# Patient Record
Sex: FEMALE | Race: BLACK OR AFRICAN AMERICAN | NOT HISPANIC OR LATINO | URBAN - METROPOLITAN AREA
[De-identification: names, ages, dates, MRNs, and addresses within clinical notes are randomized per-mention and may not be internally consistent; named-entity substitution may affect disease eponyms.]

---

## 2017-03-24 ENCOUNTER — EMERGENCY (EMERGENCY)
Facility: HOSPITAL | Age: 42
LOS: 0 days | Discharge: HOME | End: 2017-03-24
Attending: EMERGENCY MEDICINE
Payer: COMMERCIAL

## 2017-03-24 VITALS
OXYGEN SATURATION: 100 % | DIASTOLIC BLOOD PRESSURE: 93 MMHG | TEMPERATURE: 99 F | RESPIRATION RATE: 20 BRPM | HEART RATE: 112 BPM | HEIGHT: 62 IN | WEIGHT: 184.97 LBS | SYSTOLIC BLOOD PRESSURE: 159 MMHG

## 2017-03-24 VITALS
TEMPERATURE: 99 F | HEART RATE: 108 BPM | DIASTOLIC BLOOD PRESSURE: 67 MMHG | SYSTOLIC BLOOD PRESSURE: 126 MMHG | OXYGEN SATURATION: 96 % | RESPIRATION RATE: 20 BRPM

## 2017-03-24 DIAGNOSIS — R06.02 SHORTNESS OF BREATH: ICD-10-CM

## 2017-03-24 DIAGNOSIS — J44.1 CHRONIC OBSTRUCTIVE PULMONARY DISEASE WITH (ACUTE) EXACERBATION: ICD-10-CM

## 2017-03-24 DIAGNOSIS — F17.210 NICOTINE DEPENDENCE, CIGARETTES, UNCOMPLICATED: ICD-10-CM
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
## 2017-03-24 DIAGNOSIS — Z88.8 ALLERGY STATUS TO OTHER DRUGS, MEDICAMENTS AND BIOLOGICAL SUBSTANCES STATUS: ICD-10-CM
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
## 2017-03-24 DIAGNOSIS — T78.40XA ALLERGY, UNSPECIFIED, INITIAL ENCOUNTER: ICD-10-CM

## 2017-03-24 DIAGNOSIS — J40 BRONCHITIS, NOT SPECIFIED AS ACUTE OR CHRONIC: ICD-10-CM

## 2017-03-24 LAB
ALBUMIN SERPL ELPH-MCNC: 3.7 G/DL — SIGNIFICANT CHANGE UP (ref 3.3–5)
ALP SERPL-CCNC: 86 U/L — SIGNIFICANT CHANGE UP (ref 40–120)
ALT FLD-CCNC: 29 U/L — SIGNIFICANT CHANGE UP (ref 12–78)
ANION GAP SERPL CALC-SCNC: 9 MMOL/L — SIGNIFICANT CHANGE UP (ref 5–17)
APTT BLD: 28.1 SEC — SIGNIFICANT CHANGE UP (ref 27.5–37.4)
AST SERPL-CCNC: 31 U/L — SIGNIFICANT CHANGE UP (ref 15–37)
BASE EXCESS BLDA CALC-SCNC: -0.8 MMOL/L — SIGNIFICANT CHANGE UP (ref -2–2)
BASOPHILS # BLD AUTO: 0 K/UL — SIGNIFICANT CHANGE UP (ref 0–0.2)
BASOPHILS NFR BLD AUTO: 0.5 % — SIGNIFICANT CHANGE UP (ref 0–2)
BILIRUB SERPL-MCNC: 0.8 MG/DL — SIGNIFICANT CHANGE UP (ref 0.2–1.2)
BLOOD GAS COMMENTS: SIGNIFICANT CHANGE UP
BLOOD GAS COMMENTS: SIGNIFICANT CHANGE UP
BLOOD GAS SOURCE: SIGNIFICANT CHANGE UP
BUN SERPL-MCNC: 6 MG/DL — LOW (ref 7–23)
CALCIUM SERPL-MCNC: 8.4 MG/DL — LOW (ref 8.5–10.1)
CHLORIDE SERPL-SCNC: 104 MMOL/L — SIGNIFICANT CHANGE UP (ref 96–108)
CK MB BLD-MCNC: 0.7 % — SIGNIFICANT CHANGE UP (ref 0–3.5)
CK MB CFR SERPL CALC: 1.8 NG/ML — SIGNIFICANT CHANGE UP (ref 0.5–3.6)
CK SERPL-CCNC: 252 U/L — HIGH (ref 26–192)
CO2 SERPL-SCNC: 26 MMOL/L — SIGNIFICANT CHANGE UP (ref 22–31)
CREAT SERPL-MCNC: 0.87 MG/DL — SIGNIFICANT CHANGE UP (ref 0.5–1.3)
D DIMER BLD IA.RAPID-MCNC: <150 NG/ML DDU — SIGNIFICANT CHANGE UP
EOSINOPHIL # BLD AUTO: 0.1 K/UL — SIGNIFICANT CHANGE UP (ref 0–0.5)
EOSINOPHIL NFR BLD AUTO: 2.2 % — SIGNIFICANT CHANGE UP (ref 0–6)
GLUCOSE SERPL-MCNC: 128 MG/DL — HIGH (ref 70–99)
HCG SERPL-ACNC: <1 MIU/ML — SIGNIFICANT CHANGE UP
HCO3 BLDA-SCNC: 23 MMOL/L — SIGNIFICANT CHANGE UP (ref 21–29)
HCT VFR BLD CALC: 41.8 % — SIGNIFICANT CHANGE UP (ref 34.5–45)
HGB BLD-MCNC: 14.1 G/DL — SIGNIFICANT CHANGE UP (ref 11.5–15.5)
HOROWITZ INDEX BLDA+IHG-RTO: 28 — SIGNIFICANT CHANGE UP
INR BLD: 1.04 RATIO — SIGNIFICANT CHANGE UP (ref 0.88–1.16)
LYMPHOCYTES # BLD AUTO: 1.4 K/UL — SIGNIFICANT CHANGE UP (ref 1–3.3)
LYMPHOCYTES # BLD AUTO: 24 % — SIGNIFICANT CHANGE UP (ref 13–44)
MCHC RBC-ENTMCNC: 33.8 GM/DL — SIGNIFICANT CHANGE UP (ref 32–36)
MCHC RBC-ENTMCNC: 34 PG — SIGNIFICANT CHANGE UP (ref 27–34)
MCV RBC AUTO: 100.6 FL — HIGH (ref 80–100)
MONOCYTES # BLD AUTO: 0.3 K/UL — SIGNIFICANT CHANGE UP (ref 0–0.9)
MONOCYTES NFR BLD AUTO: 5.6 % — SIGNIFICANT CHANGE UP (ref 2–14)
NEUTROPHILS # BLD AUTO: 3.9 K/UL — SIGNIFICANT CHANGE UP (ref 1.8–7.4)
NEUTROPHILS NFR BLD AUTO: 67.7 % — SIGNIFICANT CHANGE UP (ref 43–77)
NT-PROBNP SERPL-SCNC: 69 PG/ML — SIGNIFICANT CHANGE UP (ref 0–125)
PCO2 BLDA: 36 MMHG — SIGNIFICANT CHANGE UP (ref 32–46)
PH BLD: 7.42 — SIGNIFICANT CHANGE UP (ref 7.35–7.45)
PLATELET # BLD AUTO: 185 K/UL — SIGNIFICANT CHANGE UP (ref 150–400)
PO2 BLDA: 81 MMHG — SIGNIFICANT CHANGE UP (ref 74–108)
POTASSIUM SERPL-MCNC: 3.5 MMOL/L — SIGNIFICANT CHANGE UP (ref 3.5–5.3)
POTASSIUM SERPL-SCNC: 3.5 MMOL/L — SIGNIFICANT CHANGE UP (ref 3.5–5.3)
PROT SERPL-MCNC: 7.7 GM/DL — SIGNIFICANT CHANGE UP (ref 6–8.3)
PROTHROM AB SERPL-ACNC: 11.4 SEC — SIGNIFICANT CHANGE UP (ref 9.8–12.7)
RBC # BLD: 4.15 M/UL — SIGNIFICANT CHANGE UP (ref 3.8–5.2)
RBC # FLD: 13.8 % — SIGNIFICANT CHANGE UP (ref 11–15)
SAO2 % BLDA: 96 % — SIGNIFICANT CHANGE UP (ref 92–96)
SODIUM SERPL-SCNC: 139 MMOL/L — SIGNIFICANT CHANGE UP (ref 135–145)
TROPONIN I SERPL-MCNC: <.015 NG/ML — SIGNIFICANT CHANGE UP (ref 0.01–0.04)
WBC # BLD: 5.7 K/UL — SIGNIFICANT CHANGE UP (ref 3.8–10.5)
WBC # FLD AUTO: 5.7 K/UL — SIGNIFICANT CHANGE UP (ref 3.8–10.5)

## 2017-03-24 PROCEDURE — 71010: CPT | Mod: 26

## 2017-03-24 PROCEDURE — 99285 EMERGENCY DEPT VISIT HI MDM: CPT

## 2017-03-24 RX ORDER — PANTOPRAZOLE SODIUM 20 MG/1
40 TABLET, DELAYED RELEASE ORAL ONCE
Qty: 0 | Refills: 0 | Status: COMPLETED | OUTPATIENT
Start: 2017-03-24 | End: 2017-03-24

## 2017-03-24 RX ORDER — DIPHENHYDRAMINE HCL 50 MG
50 CAPSULE ORAL ONCE
Qty: 0 | Refills: 0 | Status: COMPLETED | OUTPATIENT
Start: 2017-03-24 | End: 2017-03-24

## 2017-03-24 RX ORDER — DIPHENHYDRAMINE HCL 50 MG
2 CAPSULE ORAL
Qty: 18 | Refills: 0 | OUTPATIENT
Start: 2017-03-24 | End: 2017-03-27

## 2017-03-24 RX ORDER — ALBUTEROL 90 UG/1
2 AEROSOL, METERED ORAL
Qty: 1 | Refills: 0 | OUTPATIENT
Start: 2017-03-24 | End: 2017-04-23

## 2017-03-24 RX ORDER — IPRATROPIUM/ALBUTEROL SULFATE 18-103MCG
3 AEROSOL WITH ADAPTER (GRAM) INHALATION ONCE
Qty: 0 | Refills: 0 | Status: COMPLETED | OUTPATIENT
Start: 2017-03-24 | End: 2017-03-24

## 2017-03-24 RX ORDER — MAGNESIUM SULFATE 500 MG/ML
2 VIAL (ML) INJECTION ONCE
Qty: 0 | Refills: 0 | Status: COMPLETED | OUTPATIENT
Start: 2017-03-24 | End: 2017-03-24

## 2017-03-24 RX ORDER — FAMOTIDINE 10 MG/ML
20 INJECTION INTRAVENOUS ONCE
Qty: 0 | Refills: 0 | Status: COMPLETED | OUTPATIENT
Start: 2017-03-24 | End: 2017-03-24

## 2017-03-24 RX ORDER — FAMOTIDINE 10 MG/ML
1 INJECTION INTRAVENOUS
Qty: 7 | Refills: 0 | OUTPATIENT
Start: 2017-03-24 | End: 2017-03-31

## 2017-03-24 RX ORDER — CIPROFLOXACIN LACTATE 400MG/40ML
1 VIAL (ML) INTRAVENOUS
Qty: 10 | Refills: 0 | OUTPATIENT
Start: 2017-03-24 | End: 2017-04-03

## 2017-03-24 RX ADMIN — Medication 50 MILLIGRAM(S): at 14:45

## 2017-03-24 RX ADMIN — Medication 50 GRAM(S): at 14:45

## 2017-03-24 RX ADMIN — PANTOPRAZOLE SODIUM 40 MILLIGRAM(S): 20 TABLET, DELAYED RELEASE ORAL at 13:31

## 2017-03-24 RX ADMIN — FAMOTIDINE 20 MILLIGRAM(S): 10 INJECTION INTRAVENOUS at 14:45

## 2017-03-24 RX ADMIN — Medication 3 MILLILITER(S): at 13:10

## 2017-03-24 RX ADMIN — Medication 125 MILLIGRAM(S): at 13:31

## 2017-03-24 RX ADMIN — Medication 3 MILLILITER(S): at 13:31

## 2017-03-24 RX ADMIN — Medication 3 MILLILITER(S): at 13:51

## 2017-03-24 NOTE — ED ADULT NURSE REASSESSMENT NOTE - NS ED NURSE REASSESS COMMENT FT1
patient in no acute distress sleeping at this time easy to arouse denied rash or difficulty swallowing or breathing at this time

## 2017-03-24 NOTE — ED PROVIDER NOTE - PROGRESS NOTE DETAILS
Pt is alert and oriented x 3 c/o itching rash to bilateral fore arms blenching rash denies difficulty of swallowing no sob. Pt has normal PO2 on abg, d-dimer normal, sts she is breathing better after the duoneb and pt developed itching rash all over her body, PE is unlikely. Pt has normal PO2 on abg, d-dimer normal, sts she is breathing better after the duoneb and pt developed itching rash all over her body, PE is unlikely. CT angio chest with iv contrast will increase risk of allergic reaction worsening. ct chest is cancelled. Pt is alert and oriented x 3 smiling sts she is breathing much better now Pt's breath sounds are clear equal bilaterally pt is able to walk to and from the bathroom without sob. Pt denies headache, dizziness, difficulty of swallowing or breathing, cough, chest pain, nausea, vomiting, fever, chills, abd pain. Pt is advised to stop smoking and follow up wit pmd as soon as possible copd and for allergic reaction.

## 2017-03-24 NOTE — ED ADULT NURSE REASSESSMENT NOTE - NS ED NURSE REASSESS COMMENT FT1
patient A&Ox3 steady gait , patient discharge as orders , patient aware she can not drive , confirm understanding , Dr Reyes talk with patient explain she can not drive , she confirm understanding , as per Dr Reyes patient OK to be discharge home at this time discharge as orders heplock remove left ER self ambulated

## 2017-03-24 NOTE — ED ADULT NURSE REASSESSMENT NOTE - NS ED NURSE REASSESS COMMENT FT1
patient c/o of rash bilateral arms , Md aware , denied difficulty swallowing mild difficulty breathing Md aware

## 2017-03-24 NOTE — ED PROVIDER NOTE - CONSTITUTIONAL, MLM
normal... Well appearing, well nourished, awake, alert, oriented to person, place, time/situation and in no apparent distress. Speaking in clear full sentences no nasal flaring no shoulders retractions no diaphoresis

## 2017-03-24 NOTE — ED PROVIDER NOTE - CARE PLAN
Principal Discharge DX:	COPD (chronic obstructive pulmonary disease)  Secondary Diagnosis:	Bronchitis  Secondary Diagnosis:	Allergic reaction

## 2017-03-24 NOTE — ED PROVIDER NOTE - OBJECTIVE STATEMENT
41 years old female where c/o productive coughs with green sputum for 3 days and sob /chest congestion since this morning. Pt admits to smoke a pack of cigarettes per day for years. Pt denies headache, dizziness, nausea, vomiting, fever, chills, chest pain, abd pain, vaginal spotting or discharge, dysuria or irregular bowel movements.

## 2018-03-27 ENCOUNTER — APPOINTMENT (OUTPATIENT)
Dept: DERMATOLOGY | Facility: CLINIC | Age: 43
End: 2018-03-27
Payer: COMMERCIAL

## 2018-03-27 VITALS
SYSTOLIC BLOOD PRESSURE: 122 MMHG | DIASTOLIC BLOOD PRESSURE: 78 MMHG | WEIGHT: 180 LBS | HEIGHT: 62 IN | BODY MASS INDEX: 33.13 KG/M2

## 2018-03-27 DIAGNOSIS — Z91.89 OTHER SPECIFIED PERSONAL RISK FACTORS, NOT ELSEWHERE CLASSIFIED: ICD-10-CM

## 2018-03-27 DIAGNOSIS — B36.0 PITYRIASIS VERSICOLOR: ICD-10-CM

## 2018-03-27 DIAGNOSIS — L24.9 IRRITANT CONTACT DERMATITIS, UNSPECIFIED CAUSE: ICD-10-CM

## 2018-03-27 DIAGNOSIS — Z78.9 OTHER SPECIFIED HEALTH STATUS: ICD-10-CM

## 2018-03-27 DIAGNOSIS — Z84.0 FAMILY HISTORY OF DISEASES OF THE SKIN AND SUBCUTANEOUS TISSUE: ICD-10-CM

## 2018-03-27 PROCEDURE — 99203 OFFICE O/P NEW LOW 30 MIN: CPT

## 2018-10-27 ENCOUNTER — EMERGENCY (EMERGENCY)
Age: 43
LOS: 1 days | Discharge: HOME | End: 2018-10-27
Attending: EMERGENCY MEDICINE | Admitting: EMERGENCY MEDICINE
Payer: COMMERCIAL

## 2018-10-27 VITALS
OXYGEN SATURATION: 100 % | SYSTOLIC BLOOD PRESSURE: 163 MMHG | DIASTOLIC BLOOD PRESSURE: 95 MMHG | RESPIRATION RATE: 20 BRPM | TEMPERATURE: 99 F | HEART RATE: 102 BPM

## 2018-10-27 VITALS
DIASTOLIC BLOOD PRESSURE: 88 MMHG | SYSTOLIC BLOOD PRESSURE: 160 MMHG | TEMPERATURE: 98 F | OXYGEN SATURATION: 98 % | HEART RATE: 125 BPM | RESPIRATION RATE: 26 BRPM

## 2018-10-27 PROCEDURE — 99284 EMERGENCY DEPT VISIT MOD MDM: CPT | Mod: 25

## 2018-10-27 PROCEDURE — 71046 X-RAY EXAM CHEST 2 VIEWS: CPT | Mod: 26

## 2018-10-27 RX ORDER — IPRATROPIUM/ALBUTEROL SULFATE 18-103MCG
3 AEROSOL WITH ADAPTER (GRAM) INHALATION ONCE
Qty: 0 | Refills: 0 | Status: COMPLETED | OUTPATIENT
Start: 2018-10-27 | End: 2018-10-27

## 2018-10-27 RX ORDER — DIPHENHYDRAMINE HCL 50 MG
25 CAPSULE ORAL ONCE
Qty: 0 | Refills: 0 | Status: COMPLETED | OUTPATIENT
Start: 2018-10-27 | End: 2018-10-27

## 2018-10-27 RX ORDER — ALBUTEROL 90 UG/1
2 AEROSOL, METERED ORAL
Qty: 1 | Refills: 0 | OUTPATIENT
Start: 2018-10-27 | End: 2018-11-25

## 2018-10-27 RX ADMIN — Medication 25 MILLIGRAM(S): at 07:42

## 2018-10-27 RX ADMIN — Medication 3 MILLILITER(S): at 05:00

## 2018-10-27 RX ADMIN — Medication 3 MILLILITER(S): at 06:29

## 2018-10-27 RX ADMIN — Medication 3 MILLILITER(S): at 06:19

## 2018-10-27 RX ADMIN — Medication 60 MILLIGRAM(S): at 06:48

## 2018-10-27 NOTE — ED ADULT TRIAGE NOTE - CHIEF COMPLAINT QUOTE
Pt brought from [facility]'s ED with albuterol tx in progress for SOB, difficulty breathing, wheezing since Wed, pt also c/o left lower back pain when coughing. Reports had first asthma attack on Wed. PMHx bronchitis. EKG in progress. Pt brought from [facility]'s ED with albuterol (per pt.) tx in progress for SOB, difficulty breathing, wheezing since Wed, pt also c/o left lower back pain when coughing. Reports had first asthma attack on Wed. PMHx bronchitis. EKG in progress.

## 2018-10-27 NOTE — ED PROVIDER NOTE - PHYSICAL EXAMINATION
Gen: Well appearing, well nourished, awake, alert, oriented to person, place, time/situation and in no apparent distress.  ENMT: Airway patent. Moist mucous membranes. No stridor.  Cardiac: Tachycardic, regular rhythm.  Heart sounds S1, S2.  Respiratory: Diffuse end-expiratory wheeze. No rales/rhonchi. Pt is tachypneic with slightly increased WOB.  Abdomen: Abdomen soft, non-distended, non-tender, no guarding.  Musculoskeletal: Atraumatic. No vascular compromise. No calf swelling/tenderness. No pedal edema.  Neuro: Alert, follows commands. Speech is clear, fluent, and appropriate. Moving all extremities spontaneously.  Skin: Skin normal color for race, warm, dry and intact. No evidence of rash.

## 2018-10-27 NOTE — ED ADULT NURSE NOTE - NSIMPLEMENTINTERV_GEN_ALL_ED
Implemented All Universal Safety Interventions:  [place] to call system. Call bell, personal items and telephone within reach. Instruct patient to call for assistance. Room bathroom lighting operational. Non-slip footwear when patient is off stretcher. Physically safe environment: no spills, clutter or unnecessary equipment. Stretcher in lowest position, wheels locked, appropriate side rails in place.

## 2018-10-27 NOTE — ED ADULT NURSE NOTE - CHIEF COMPLAINT QUOTE
Pt brought from [facility]'s ED with albuterol (per pt.) tx in progress for SOB, difficulty breathing, wheezing since Wed, pt also c/o left lower back pain when coughing. Reports had first asthma attack on Wed. PMHx bronchitis. EKG in progress.

## 2018-10-27 NOTE — ED PROVIDER NOTE - ATTENDING CONTRIBUTION TO CARE
I agree with the above H&P.  Briefly this is a 43 year old female with wheezing and sob.  patient is a current smoker.  concern for asthma exacerbation. will rule out pna with cxr.  patient symptomatically improved with nebs

## 2018-10-27 NOTE — ED PROVIDER NOTE - MEDICAL DECISION MAKING DETAILS
43F with SOB/wheezing most c/w asthma/COPD exacerbation. Concern for PNA given green sputum. Pt is well-appearing with slightly increased WOB. Plan: duonebs, steroids, CXR, reassess.

## 2018-10-27 NOTE — ED PROVIDER NOTE - OBJECTIVE STATEMENT
43F h/o asthma/COPD, current smoker, p/w SOB today in the setting of sinus congestion x 3 days, a/w cough productive of yellow and green sputum. No fevers, chest pain, N/V, or abd pain. No recent steroids. No ICU admissions or intubations for asthma.

## 2018-10-28 NOTE — ED POST DISCHARGE NOTE - DETAILS
Esmer LEE, PGY-4: called pt at [phone], no answer, unable to leave msg Esmer LEE, PGY-4: called pt at [phone], no answer, unable to leave msg

## 2018-10-28 NOTE — ED POST DISCHARGE NOTE - ADDITIONAL DOCUMENTATION
Esmer LEE, PGY-4: Received email from NASIMA Myers that pt called and asked for abx. Pt's CXR was clear and pt's presentation was c/w asthma exacerbation in the setting of a URI (not a pneumonia), abx were not given when pt was seen in the ED. I attempted to call pt at listed number to check on how she was doing but was unable to reach her. Esmer LEE, PGY-4: Received email from NASIMA Myers that pt called and asked for abx. Pt's CXR was clear and pt's presentation was c/w asthma exacerbation in the setting of a URI (not a pneumonia), so abx were not prescribed. I attempted to call pt at listed number to check on how she was doing but was unable to reach her.

## 2018-10-28 NOTE — ED POST DISCHARGE NOTE - REASON FOR FOLLOW-UP
Other Patient called asking about Abx that was supposed to be ERX to her pharmacy. No mention in ED provider note of Abx. Emailed Dr Moy and Dr Lyman asking if Abx required. Patient received Ventolin and prednisone.

## 2018-12-17 ENCOUNTER — INPATIENT (INPATIENT)
Facility: HOSPITAL | Age: 43
LOS: 1 days | Discharge: HOME | End: 2018-12-19
Attending: HOSPITALIST | Admitting: HOSPITALIST
Payer: COMMERCIAL

## 2018-12-17 VITALS
RESPIRATION RATE: 18 BRPM | OXYGEN SATURATION: 96 % | TEMPERATURE: 99 F | WEIGHT: 175.05 LBS | DIASTOLIC BLOOD PRESSURE: 92 MMHG | HEART RATE: 132 BPM | HEIGHT: 63 IN | SYSTOLIC BLOOD PRESSURE: 136 MMHG

## 2018-12-17 LAB
ALBUMIN SERPL ELPH-MCNC: 4.1 G/DL — SIGNIFICANT CHANGE UP (ref 3.3–5)
ALP SERPL-CCNC: 80 U/L — SIGNIFICANT CHANGE UP (ref 40–120)
ALT FLD-CCNC: 65 U/L — SIGNIFICANT CHANGE UP (ref 12–78)
AMYLASE P1 CFR SERPL: 39 U/L — SIGNIFICANT CHANGE UP (ref 25–115)
ANION GAP SERPL CALC-SCNC: 14 MMOL/L — SIGNIFICANT CHANGE UP (ref 5–17)
APTT BLD: 24.8 SEC — LOW (ref 27.5–36.3)
AST SERPL-CCNC: 127 U/L — HIGH (ref 15–37)
BASOPHILS # BLD AUTO: 0.03 K/UL — SIGNIFICANT CHANGE UP (ref 0–0.2)
BASOPHILS NFR BLD AUTO: 0.6 % — SIGNIFICANT CHANGE UP (ref 0–2)
BILIRUB SERPL-MCNC: 1.1 MG/DL — SIGNIFICANT CHANGE UP (ref 0.2–1.2)
BLD GP AB SCN SERPL QL: SIGNIFICANT CHANGE UP
BUN SERPL-MCNC: 6 MG/DL — LOW (ref 7–23)
CALCIUM SERPL-MCNC: 8.4 MG/DL — LOW (ref 8.5–10.1)
CHLORIDE SERPL-SCNC: 102 MMOL/L — SIGNIFICANT CHANGE UP (ref 96–108)
CO2 SERPL-SCNC: 22 MMOL/L — SIGNIFICANT CHANGE UP (ref 22–31)
CREAT SERPL-MCNC: 0.73 MG/DL — SIGNIFICANT CHANGE UP (ref 0.5–1.3)
EOSINOPHIL # BLD AUTO: 0.08 K/UL — SIGNIFICANT CHANGE UP (ref 0–0.5)
EOSINOPHIL NFR BLD AUTO: 1.5 % — SIGNIFICANT CHANGE UP (ref 0–6)
ETHANOL SERPL-MCNC: <10 MG/DL — SIGNIFICANT CHANGE UP (ref 0–10)
GLUCOSE SERPL-MCNC: 130 MG/DL — HIGH (ref 70–99)
HCG SERPL-ACNC: <1 MIU/ML — SIGNIFICANT CHANGE UP
HCT VFR BLD CALC: 37.5 % — SIGNIFICANT CHANGE UP (ref 34.5–45)
HGB BLD-MCNC: 12.9 G/DL — SIGNIFICANT CHANGE UP (ref 11.5–15.5)
IMM GRANULOCYTES NFR BLD AUTO: 0.2 % — SIGNIFICANT CHANGE UP (ref 0–1.5)
INR BLD: 1.12 RATIO — SIGNIFICANT CHANGE UP (ref 0.88–1.16)
LIDOCAIN IGE QN: 126 U/L — SIGNIFICANT CHANGE UP (ref 73–393)
LYMPHOCYTES # BLD AUTO: 0.97 K/UL — LOW (ref 1–3.3)
LYMPHOCYTES # BLD AUTO: 18.1 % — SIGNIFICANT CHANGE UP (ref 13–44)
MCHC RBC-ENTMCNC: 34 PG — SIGNIFICANT CHANGE UP (ref 27–34)
MCHC RBC-ENTMCNC: 34.4 GM/DL — SIGNIFICANT CHANGE UP (ref 32–36)
MCV RBC AUTO: 98.9 FL — SIGNIFICANT CHANGE UP (ref 80–100)
MONOCYTES # BLD AUTO: 0.2 K/UL — SIGNIFICANT CHANGE UP (ref 0–0.9)
MONOCYTES NFR BLD AUTO: 3.7 % — SIGNIFICANT CHANGE UP (ref 2–14)
NEUTROPHILS # BLD AUTO: 4.06 K/UL — SIGNIFICANT CHANGE UP (ref 1.8–7.4)
NEUTROPHILS NFR BLD AUTO: 75.9 % — SIGNIFICANT CHANGE UP (ref 43–77)
NRBC # BLD: 0 /100 WBCS — SIGNIFICANT CHANGE UP (ref 0–0)
PLATELET # BLD AUTO: 256 K/UL — SIGNIFICANT CHANGE UP (ref 150–400)
POTASSIUM SERPL-MCNC: 3.6 MMOL/L — SIGNIFICANT CHANGE UP (ref 3.5–5.3)
POTASSIUM SERPL-SCNC: 3.6 MMOL/L — SIGNIFICANT CHANGE UP (ref 3.5–5.3)
PROT SERPL-MCNC: 7.7 GM/DL — SIGNIFICANT CHANGE UP (ref 6–8.3)
PROTHROM AB SERPL-ACNC: 12.6 SEC — SIGNIFICANT CHANGE UP (ref 10–12.9)
RBC # BLD: 3.79 M/UL — LOW (ref 3.8–5.2)
RBC # FLD: 14.4 % — SIGNIFICANT CHANGE UP (ref 10.3–14.5)
SODIUM SERPL-SCNC: 138 MMOL/L — SIGNIFICANT CHANGE UP (ref 135–145)
WBC # BLD: 5.35 K/UL — SIGNIFICANT CHANGE UP (ref 3.8–10.5)
WBC # FLD AUTO: 5.35 K/UL — SIGNIFICANT CHANGE UP (ref 3.8–10.5)

## 2018-12-17 PROCEDURE — 99285 EMERGENCY DEPT VISIT HI MDM: CPT

## 2018-12-17 RX ORDER — PANTOPRAZOLE SODIUM 20 MG/1
40 TABLET, DELAYED RELEASE ORAL ONCE
Qty: 0 | Refills: 0 | Status: COMPLETED | OUTPATIENT
Start: 2018-12-17 | End: 2018-12-17

## 2018-12-17 RX ORDER — ONDANSETRON 8 MG/1
4 TABLET, FILM COATED ORAL ONCE
Qty: 0 | Refills: 0 | Status: COMPLETED | OUTPATIENT
Start: 2018-12-17 | End: 2018-12-17

## 2018-12-17 RX ORDER — PANTOPRAZOLE SODIUM 20 MG/1
8 TABLET, DELAYED RELEASE ORAL
Qty: 80 | Refills: 0 | Status: DISCONTINUED | OUTPATIENT
Start: 2018-12-17 | End: 2018-12-19

## 2018-12-17 RX ORDER — SODIUM CHLORIDE 9 MG/ML
1000 INJECTION INTRAMUSCULAR; INTRAVENOUS; SUBCUTANEOUS ONCE
Qty: 0 | Refills: 0 | Status: COMPLETED | OUTPATIENT
Start: 2018-12-17 | End: 2018-12-17

## 2018-12-17 RX ADMIN — SODIUM CHLORIDE 1000 MILLILITER(S): 9 INJECTION INTRAMUSCULAR; INTRAVENOUS; SUBCUTANEOUS at 20:58

## 2018-12-17 RX ADMIN — PANTOPRAZOLE SODIUM 10 MG/HR: 20 TABLET, DELAYED RELEASE ORAL at 20:57

## 2018-12-17 RX ADMIN — PANTOPRAZOLE SODIUM 40 MILLIGRAM(S): 20 TABLET, DELAYED RELEASE ORAL at 21:00

## 2018-12-17 RX ADMIN — SODIUM CHLORIDE 1000 MILLILITER(S): 9 INJECTION INTRAMUSCULAR; INTRAVENOUS; SUBCUTANEOUS at 21:55

## 2018-12-17 RX ADMIN — ONDANSETRON 4 MILLIGRAM(S): 8 TABLET, FILM COATED ORAL at 20:57

## 2018-12-17 NOTE — ED PROVIDER NOTE - PHYSICAL EXAMINATION
Gen: Alert, NAD  Head: NC, AT, normal lids/conjunctiva  ENT: normal hearing, patent oropharynx without erythema/exudate, uvula midline  Neck: +supple, no tenderness/meningismus/JVD, +Trachea midline  Pulm: Bilateral BS, normal resp effort, no wheeze/stridor/retractions  CV: tachycardia, no M/R/G, +dist pulses  Abd: soft, +epigastric and lower abdominal tenderness to palpation, ND, +BS, no palpable masses  Mskel: no edema/erythema/cyanosis  Skin: no rash, warm/dry  Neuro: AAOx3, no apparent sensory/motor deficits, coordination intact

## 2018-12-17 NOTE — ED ADULT NURSE NOTE - OBJECTIVE STATEMENT
Pt is A&o X3, presents with complaints of hematemesis, bright red in color, associated w/ mid abdominal pain associated onset tofday. also reports that she had 3 episodes of diarrhea yesterday. No fevers or chills

## 2018-12-17 NOTE — ED PROVIDER NOTE - OBJECTIVE STATEMENT
Pertinent PMH/PSH/FHx/SHx and Review of Systems contained within:  Patient presents to the ED for vomiting blood.  Patient started vomiting today, states that vomit started clear, gradually became pink tinged and eventually progressed to large bright red blood.  Denies chest pain or shortness of breath, has episgastric pain and soreness from her liposuction surgery 1 month ago.  Denies any melena or diarrhea.  Does not take blood thinners but is a daily drinker consuming 4-5 shots of hard liquor every night.  Denies prior history of vomiting blood.     Relevant PMHx/SHx/SOCHx/FAMH:  asthma, HTN, liposuction & gluteal lift 1 month ago  +Smoker, denies use of other illict drugs    ROS: No fever/chills, No headache/photophobia/eye pain/changes in vision, No ear pain/sore throat/dysphagia, No chest pain/palpitations, no SOB/cough/wheeze/stridor, No D/melena, no dysuria/frequency/discharge, No neck/back pain, no rash, no changes in neurological status/function.

## 2018-12-17 NOTE — ED PROVIDER NOTE - MEDICAL DECISION MAKING DETAILS
Patient with hematemsis in setting of regular drinking.  VSS.  No episodes of hematemsis in ER.  Lab values reviewed, there are no values which require acute intervention.  CT imaging without acute pathology.  On protonix drip.  GI to be consulted in the morning.  Patient is to be admitted to the hospital and the case was discussed with the admitting physician.  Any changes in plan, additional imaging/labs, and further work up will be at the discretion of the admitting physician.

## 2018-12-17 NOTE — ED ADULT NURSE NOTE - NSIMPLEMENTINTERV_GEN_ALL_ED
Implemented All Universal Safety Interventions:  [place] to call system. Call bell, personal items and telephone within reach. Instruct patient to call for assistance. Room bathroom lighting operational. Non-slip footwear when patient is off stretcher. Physically safe environment: no spills, clutter or unnecessary equipment. Stretcher in lowest position, wheels locked, appropriate side rails in place.

## 2018-12-18 DIAGNOSIS — Z98.890 OTHER SPECIFIED POSTPROCEDURAL STATES: Chronic | ICD-10-CM

## 2018-12-18 DIAGNOSIS — K92.2 GASTROINTESTINAL HEMORRHAGE, UNSPECIFIED: ICD-10-CM

## 2018-12-18 DIAGNOSIS — J45.20 MILD INTERMITTENT ASTHMA, UNCOMPLICATED: ICD-10-CM

## 2018-12-18 DIAGNOSIS — E51.9 THIAMINE DEFICIENCY, UNSPECIFIED: ICD-10-CM

## 2018-12-18 DIAGNOSIS — F10.10 ALCOHOL ABUSE, UNCOMPLICATED: ICD-10-CM

## 2018-12-18 PROBLEM — J44.9 CHRONIC OBSTRUCTIVE PULMONARY DISEASE, UNSPECIFIED: Chronic | Status: INACTIVE | Noted: 2018-10-27 | Resolved: 2018-12-18

## 2018-12-18 LAB
ALBUMIN SERPL ELPH-MCNC: 3.6 G/DL — SIGNIFICANT CHANGE UP (ref 3.3–5)
ALP SERPL-CCNC: 80 U/L — SIGNIFICANT CHANGE UP (ref 40–120)
ALT FLD-CCNC: 59 U/L — SIGNIFICANT CHANGE UP (ref 12–78)
ANION GAP SERPL CALC-SCNC: 10 MMOL/L — SIGNIFICANT CHANGE UP (ref 5–17)
AST SERPL-CCNC: 112 U/L — HIGH (ref 15–37)
BILIRUB DIRECT SERPL-MCNC: 0.38 MG/DL — HIGH (ref 0.05–0.2)
BILIRUB INDIRECT FLD-MCNC: 0.9 MG/DL — SIGNIFICANT CHANGE UP (ref 0.2–1)
BILIRUB SERPL-MCNC: 1.3 MG/DL — HIGH (ref 0.2–1.2)
BUN SERPL-MCNC: 4 MG/DL — LOW (ref 7–23)
CALCIUM SERPL-MCNC: 8.2 MG/DL — LOW (ref 8.5–10.1)
CHLORIDE SERPL-SCNC: 107 MMOL/L — SIGNIFICANT CHANGE UP (ref 96–108)
CO2 SERPL-SCNC: 24 MMOL/L — SIGNIFICANT CHANGE UP (ref 22–31)
CREAT SERPL-MCNC: 0.7 MG/DL — SIGNIFICANT CHANGE UP (ref 0.5–1.3)
GLUCOSE SERPL-MCNC: 119 MG/DL — HIGH (ref 70–99)
HCT VFR BLD CALC: 36.3 % — SIGNIFICANT CHANGE UP (ref 34.5–45)
HGB BLD-MCNC: 12 G/DL — SIGNIFICANT CHANGE UP (ref 11.5–15.5)
MAGNESIUM SERPL-MCNC: 2.1 MG/DL — SIGNIFICANT CHANGE UP (ref 1.6–2.6)
MCHC RBC-ENTMCNC: 33.1 GM/DL — SIGNIFICANT CHANGE UP (ref 32–36)
MCHC RBC-ENTMCNC: 33.1 PG — SIGNIFICANT CHANGE UP (ref 27–34)
MCV RBC AUTO: 100 FL — SIGNIFICANT CHANGE UP (ref 80–100)
NRBC # BLD: 0 /100 WBCS — SIGNIFICANT CHANGE UP (ref 0–0)
PHOSPHATE SERPL-MCNC: 3 MG/DL — SIGNIFICANT CHANGE UP (ref 2.5–4.5)
PLATELET # BLD AUTO: 192 K/UL — SIGNIFICANT CHANGE UP (ref 150–400)
POTASSIUM SERPL-MCNC: 3.3 MMOL/L — LOW (ref 3.5–5.3)
POTASSIUM SERPL-SCNC: 3.3 MMOL/L — LOW (ref 3.5–5.3)
PROT SERPL-MCNC: 6.7 GM/DL — SIGNIFICANT CHANGE UP (ref 6–8.3)
RBC # BLD: 3.63 M/UL — LOW (ref 3.8–5.2)
RBC # FLD: 14.2 % — SIGNIFICANT CHANGE UP (ref 10.3–14.5)
SODIUM SERPL-SCNC: 141 MMOL/L — SIGNIFICANT CHANGE UP (ref 135–145)
WBC # BLD: 3.27 K/UL — LOW (ref 3.8–10.5)
WBC # FLD AUTO: 3.27 K/UL — LOW (ref 3.8–10.5)

## 2018-12-18 PROCEDURE — 93010 ELECTROCARDIOGRAM REPORT: CPT

## 2018-12-18 PROCEDURE — 12345: CPT | Mod: NC

## 2018-12-18 PROCEDURE — 71045 X-RAY EXAM CHEST 1 VIEW: CPT | Mod: 26

## 2018-12-18 PROCEDURE — 74177 CT ABD & PELVIS W/CONTRAST: CPT | Mod: 26

## 2018-12-18 PROCEDURE — 99223 1ST HOSP IP/OBS HIGH 75: CPT

## 2018-12-18 RX ORDER — NICOTINE POLACRILEX 2 MG
1 GUM BUCCAL DAILY
Qty: 0 | Refills: 0 | Status: DISCONTINUED | OUTPATIENT
Start: 2018-12-18 | End: 2018-12-19

## 2018-12-18 RX ORDER — FOLIC ACID 0.8 MG
1 TABLET ORAL DAILY
Qty: 0 | Refills: 0 | Status: DISCONTINUED | OUTPATIENT
Start: 2018-12-18 | End: 2018-12-19

## 2018-12-18 RX ORDER — SODIUM CHLORIDE 9 MG/ML
1000 INJECTION, SOLUTION INTRAVENOUS
Qty: 0 | Refills: 0 | Status: COMPLETED | OUTPATIENT
Start: 2018-12-18 | End: 2018-12-18

## 2018-12-18 RX ORDER — POTASSIUM CHLORIDE 20 MEQ
40 PACKET (EA) ORAL ONCE
Qty: 0 | Refills: 0 | Status: COMPLETED | OUTPATIENT
Start: 2018-12-18 | End: 2018-12-18

## 2018-12-18 RX ORDER — THIAMINE MONONITRATE (VIT B1) 100 MG
100 TABLET ORAL DAILY
Qty: 0 | Refills: 0 | Status: DISCONTINUED | OUTPATIENT
Start: 2018-12-18 | End: 2018-12-19

## 2018-12-18 RX ORDER — SODIUM CHLORIDE 9 MG/ML
1000 INJECTION, SOLUTION INTRAVENOUS
Qty: 0 | Refills: 0 | Status: DISCONTINUED | OUTPATIENT
Start: 2018-12-18 | End: 2018-12-18

## 2018-12-18 RX ORDER — AMLODIPINE BESYLATE 2.5 MG/1
5 TABLET ORAL DAILY
Qty: 0 | Refills: 0 | Status: DISCONTINUED | OUTPATIENT
Start: 2018-12-18 | End: 2018-12-19

## 2018-12-18 RX ORDER — ALBUTEROL 90 UG/1
2 AEROSOL, METERED ORAL EVERY 6 HOURS
Qty: 0 | Refills: 0 | Status: DISCONTINUED | OUTPATIENT
Start: 2018-12-18 | End: 2018-12-19

## 2018-12-18 RX ORDER — ONDANSETRON 8 MG/1
4 TABLET, FILM COATED ORAL EVERY 6 HOURS
Qty: 0 | Refills: 0 | Status: DISCONTINUED | OUTPATIENT
Start: 2018-12-18 | End: 2018-12-19

## 2018-12-18 RX ADMIN — Medication 100 MILLIGRAM(S): at 11:31

## 2018-12-18 RX ADMIN — Medication 40 MILLIEQUIVALENT(S): at 11:30

## 2018-12-18 RX ADMIN — Medication 1 PATCH: at 17:09

## 2018-12-18 RX ADMIN — PANTOPRAZOLE SODIUM 10 MG/HR: 20 TABLET, DELAYED RELEASE ORAL at 21:09

## 2018-12-18 RX ADMIN — Medication 1 PATCH: at 05:02

## 2018-12-18 RX ADMIN — Medication 1 TABLET(S): at 11:31

## 2018-12-18 RX ADMIN — PANTOPRAZOLE SODIUM 10 MG/HR: 20 TABLET, DELAYED RELEASE ORAL at 12:24

## 2018-12-18 RX ADMIN — SODIUM CHLORIDE 125 MILLILITER(S): 9 INJECTION, SOLUTION INTRAVENOUS at 09:28

## 2018-12-18 RX ADMIN — Medication 1 MILLIGRAM(S): at 11:31

## 2018-12-18 NOTE — CONSULT NOTE ADULT - ASSESSMENT
HPI:  43y Female PMH asthma, HTN, liposuction & gluteal lift 1 month ago, presents to the ED for vomiting blood.  Patient started vomiting today, states that vomit started clear, gradually became pink tinged and eventually progressed to large bright red blood.  Denies chest pain or shortness of breath, has epigastric pain with soreness from her liposuction surgery 1 month ago.  Denies any melena or diarrhea.  Does not take blood thinners but is a daily drinker consuming 4-5 shots of hard liquor every night.  Denies prior history of vomiting blood, Hx PUD, NSAID consumption, is + smoker. (18 Dec 2018 04:57)  --------------------------------------As Above ----------------------------------------------------------------------------------------  Patient seen earlier  The patient was in her USOH until Sunday night when after drinking a large amount of alcohol she did not feel well. She went to sleep and awakened at ~ 5 PM. She started retching shortly afterwards. First it was bile, then pink and finally red. Duration ~ 1 hour. No vomiting since.   Patient felt somewhat better this morning. Hungry.   Drinks 4 - 5 shots QHS since her liposuction. Denies NSAIDs.  Prior to Sunday, the patient denies melena, hematochezia, hematemesis, nausea, vomiting, abdominal pain, constipation, diarrhea, or change in bowel movements Never had a colonoscopy  See labs / CT scan    -----UGI Bleed - probably MW tear, doubt PUD, varices, etc.  1) clear liquid diet  2) f/u CBC  3) PPI  4) EGD in AM

## 2018-12-18 NOTE — H&P ADULT - ASSESSMENT
43y Female PMH asthma, HTN, liposuction & gluteal lift 1 month ago, presents to the ED for vomiting blood. Pt chronic ETOH abuser, no Hx PUD. Started on PPI drip in ED. Will start CIWA protocol, though pt denies Hx DT's, severe withdrawal.  IMPROVE VTE Individual Risk Assessment          RISK                                                          Points    [  ] Previous VTE                                                3    [  ] Thrombophilia                                             2    [  ] Lower limb paralysis                                    2        (unable to hold up >15 seconds)      [  ] Current Cancer                                             2         (within 6 months)    [  ] Immobilization > 24 hrs                              1    [  ] ICU/CCU stay > 24 hours                            1    [  ] Age > 60                                                    1    IMPROVE VTE Score ___0______

## 2018-12-18 NOTE — H&P ADULT - NSHPREVIEWOFSYSTEMS_GEN_ALL_CORE
ROS: No fever/chills, No headache/photophobia/eye pain/changes in vision, No ear pain/sore throat/dysphagia, No chest pain/palpitations, no SOB/cough/wheeze/stridor, No D/melena, no dysuria/frequency/discharge, No neck/back pain, no rash, no changes in neurological status/function.

## 2018-12-18 NOTE — CHART NOTE - NSCHARTNOTEFT_GEN_A_CORE
Patient seen and examined   Full note to follow   UGI Bleed, last episode ~ 6 PM  For EGD [id number] 7:30

## 2018-12-18 NOTE — H&P ADULT - HISTORY OF PRESENT ILLNESS
43y Female PMH asthma, HTN, liposuction & gluteal lift 1 month ago, presents to the ED for vomiting blood.  Patient started vomiting today, states that vomit started clear, gradually became pink tinged and eventually progressed to large bright red blood.  Denies chest pain or shortness of breath, has epigastric pain with soreness from her liposuction surgery 1 month ago.  Denies any melena or diarrhea.  Does not take blood thinners but is a daily drinker consuming 4-5 shots of hard liquor every night.  Denies prior history of vomiting blood, Hx PUD, NSAID consumption, is + smoker.

## 2018-12-18 NOTE — PROGRESS NOTE ADULT - ASSESSMENT
1.  Hematemesis - no further recurrence.  On Protonix IV.  Hemoglobin stable.  NPO after midnight for EGD.    2.  Alcoholism.  Monitor for withdrawal.  Thiamine 100 mg daily.   on abstinence.    3.  Tobacco use.  Continue nicotine replacement.   and encourage smoking cessation.    4.  HTN.  Resume outpatient amlodipine.

## 2018-12-18 NOTE — CONSULT NOTE ADULT - SUBJECTIVE AND OBJECTIVE BOX
HPI:  43y Female PMH asthma, HTN, liposuction & gluteal lift 1 month ago, presents to the ED for vomiting blood.  Patient started vomiting today, states that vomit started clear, gradually became pink tinged and eventually progressed to large bright red blood.  Denies chest pain or shortness of breath, has epigastric pain with soreness from her liposuction surgery 1 month ago.  Denies any melena or diarrhea.  Does not take blood thinners but is a daily drinker consuming 4-5 shots of hard liquor every night.  Denies prior history of vomiting blood, Hx PUD, NSAID consumption, is + smoker. (18 Dec 2018 04:57)  --------------------------------------As Above ----------------------------------------------------------------------------------------  Patient seen earlier  The patient was in her USOH until Sunday night when after drinking a large amount of alcohol she did not feel well. She went to sleep and awakened at ~ 5 PM. She started retching shortly afterwards. First it was bile, then pink and finally red. Duration ~ 1 hour. No vomiting since.   Patient felt somewhat better this morning. Hungry.   Drinks 4 - 5 shots QHS since her liposuction. Denies NSAIDs.  Prior to Sunday, the patient denies melena, hematochezia, hematemesis, nausea, vomiting, abdominal pain, constipation, diarrhea, or change in bowel movements Never had a colonoscopy  See labs / CT scan      PAST MEDICAL & SURGICAL HISTORY:  Mild intermittent asthma without complication  H/O cosmetic surgery      MEDICATIONS  (STANDING):  folic acid 1 milliGRAM(s) Oral daily  multivitamin 1 Tablet(s) Oral daily  nicotine -   7 mG/24Hr(s) Patch 1 patch Transdermal daily  pantoprazole Infusion 8 mG/Hr (10 mL/Hr) IV Continuous <Continuous>  thiamine 100 milliGRAM(s) Oral daily    MEDICATIONS  (PRN):  ALBUTerol    90 MICROgram(s) HFA Inhaler 2 Puff(s) Inhalation every 6 hours PRN Shortness of Breath and/or Wheezing  ondansetron Injectable 4 milliGRAM(s) IV Push every 6 hours PRN Nausea and/or Vomiting      Allergies    codeine (Hives)  Motrin (Angioedema)    Intolerances        FAMILY HISTORY:  No pertinent family history in first degree relatives      REVIEW OF SYSTEMS:    CONSTITUTIONAL: No fever, weight loss, or fatigue  EYES: No eye pain, visual disturbances, or discharge  ENMT:  No difficulty hearing, tinnitus, vertigo; No sinus or throat pain  NECK: No pain or stiffness  BREASTS: No pain, masses, or nipple discharge  RESPIRATORY: No cough, wheezing, chills or hemoptysis; No shortness of breath  CARDIOVASCULAR: No chest pain, palpitations, dizziness, or leg swelling  GASTROINTESTINAL: See above  GENITOURINARY: No dysuria, frequency, hematuria, or incontinence  NEUROLOGICAL: No headaches, memory loss, loss of strength, numbness, or tremors  SKIN: No itching, burning, rashes, or lesions   LYMPH NODES: No enlarged glands  ENDOCRINE: No heat or cold intolerance; No hair loss  MUSCULOSKELETAL: No joint pain or swelling; No muscle, back, or extremity pain  PSYCHIATRIC: No depression, anxiety, mood swings, or difficulty sleeping  HEME/LYMPH: No easy bruising, or bleeding gums  ALLERGY AND IMMUNOLOGIC: No hives or eczema          SOCIAL HISTORY:    FAMILY HISTORY:  No pertinent family history in first degree relatives      Vital Signs Last 24 Hrs  T(C): 37 (18 Dec 2018 10:26), Max: 37.6 (18 Dec 2018 07:04)  T(F): 98.6 (18 Dec 2018 10:26), Max: 99.6 (18 Dec 2018 07:04)  HR: 88 (18 Dec 2018 10:26) (88 - 132)  BP: 144/76 (18 Dec 2018 10:26) (136/92 - 145/74)  BP(mean): --  RR: 18 (18 Dec 2018 10:26) (16 - 18)  SpO2: 98% (18 Dec 2018 10:26) (95% - 99%)    PHYSICAL EXAM:    GENERAL: NAD, well-groomed, well-developed  HEAD:  Atraumatic, Normocephalic  EYES: EOMI, PERRLA, conjunctiva and sclera clear  ENMT: No tonsillar erythema, exudates, or enlargement; Moist mucous membranes, Good dentition, No lesions  NECK: Supple, No JVD, Normal thyroid  NERVOUS SYSTEM:  Alert & Oriented X3, Good concentration;   CHEST/LUNG: Clear to percussion bilaterally; No rales, rhonchi, wheezing, or rubs  HEART: Regular rate and rhythm; No murmurs, rubs, or gallops  ABDOMEN: Soft, Nontender, Nondistended; Bowel sounds present  EXTREMITIES:  2+ Peripheral Pulses, No clubbing, cyanosis, or edema  LYMPH: No lymphadenopathy noted   RECTAL: Deferred   SKIN: No rashes or lesions    LABS:                        12.0   3.27  )-----------( 192      ( 18 Dec 2018 07:53 )             36.3       CBC:  12-18 @ 07:53  WBC  3.27  HGB 12.0  HCT 36.3 Plate 192  .0  12-17 @ 20:49  WBC  5.35  HGB 12.9  HCT 37.5 Plate 256  MCV 98.9           18 Dec 2018 08:10    141    |  107    |  4      ----------------------------<  119    3.3     |  24     |  0.70   17 Dec 2018 20:49    138    |  102    |  6      ----------------------------<  130    3.6     |  22     |  0.73     Ca    8.2        18 Dec 2018 08:10  Ca    8.4        17 Dec 2018 20:49  Phos  3.0       18 Dec 2018 08:10  Mg     2.1       18 Dec 2018 08:10    TPro  6.7    /  Alb  3.6    /  TBili  1.3    /  DBili  .38    /  AST  112    /  ALT  59     /  AlkPhos  80     18 Dec 2018 08:10  TPro  7.7    /  Alb  4.1    /  TBili  1.1    /  DBili  x      /  AST  127    /  ALT  65     /  AlkPhos  80     17 Dec 2018 20:49    PT/INR - ( 17 Dec 2018 20:49 )   PT: 12.6 sec;   INR: 1.12 ratio         PTT - ( 17 Dec 2018 20:49 )  PTT:24.8 sec        RADIOLOGY & ADDITIONAL STUDIES:

## 2018-12-18 NOTE — H&P ADULT - NSHPPHYSICALEXAM_GEN_ALL_CORE
T(C): 37.5 (18 Dec 2018 00:29), Max: 37.5 (18 Dec 2018 00:29)  T(F): 99.5 (18 Dec 2018 00:29), Max: 99.5 (18 Dec 2018 00:29)  HR: 117 (18 Dec 2018 00:29) (117 - 132)  BP: 140/75 (18 Dec 2018 00:29) (136/92 - 140/75)  BP(mean): --  RR: 18 (18 Dec 2018 00:29) (18 - 18)  SpO2: 99% (18 Dec 2018 00:29) (96% - 99%)    PHYSICAL EXAM:  GENERAL: NAD, well-groomed, well-developed  HEAD:  Atraumatic, Normocephalic  EYES: EOMI, PERRLA, conjunctiva and sclera clear  ENMT: No tonsillar erythema, exudates, or enlargement; Moist mucous membranes, Good dentition, No lesions  NECK: Supple, No JVD, Normal thyroid  NERVOUS SYSTEM:  Alert & Oriented X3, Good concentration; Motor Strength 5/5 B/L upper and lower extremities; DTRs 2+ intact and symmetric  CHEST/LUNG: Clear to percussion bilaterally; No rales, rhonchi, wheezing, or rubs  HEART: Regular rate and rhythm; No murmurs, rubs, or gallops  ABDOMEN: Soft, tender epigastrium, Nondistended; Bowel sounds present  EXTREMITIES:  2+ Peripheral Pulses, No clubbing, cyanosis, or edema  LYMPH: No lymphadenopathy noted  SKIN: No rashes or lesions

## 2018-12-18 NOTE — H&P ADULT - NSHPLABSRESULTS_GEN_ALL_CORE
LABS:                        12.9   5.35  )-----------( 256      ( 17 Dec 2018 20:49 )             37.5     12-17    138  |  102  |  6<L>  ----------------------------<  130<H>  3.6   |  22  |  0.73    Ca    8.4<L>      17 Dec 2018 20:49    TPro  7.7  /  Alb  4.1  /  TBili  1.1  /  DBili  x   /  AST  127<H>  /  ALT  65  /  AlkPhos  80  12-17    PT/INR - ( 17 Dec 2018 20:49 )   PT: 12.6 sec;   INR: 1.12 ratio         PTT - ( 17 Dec 2018 20:49 )  PTT:24.8 sec    CAPILLARY BLOOD GLUCOSE  Alcohol, Blood: <10:   HCG Quantitative, Serum: <1: mIU/mL (12.17.18 @ 20:49)  mg/dL (12.17.18 @ 20:49)          RADIOLOGY & ADDITIONAL TESTS:  < from: CT Abdomen and Pelvis w/ IV Cont (12.18.18 @ 00:16) >    No acute gastrointestinal abnormality.  Fatty liver.  Fibroid uterus. Intrauterine device in place. 4 cm right adnexal cyst for   which nonemergent pelvic ultrasound may be obtained.    < end of copied text >      Imaging Personally Reviewed:  [x ] YES  [ ] NO

## 2018-12-19 ENCOUNTER — RESULT REVIEW (OUTPATIENT)
Age: 43
End: 2018-12-19

## 2018-12-19 ENCOUNTER — TRANSCRIPTION ENCOUNTER (OUTPATIENT)
Age: 43
End: 2018-12-19

## 2018-12-19 VITALS
RESPIRATION RATE: 17 BRPM | DIASTOLIC BLOOD PRESSURE: 69 MMHG | TEMPERATURE: 98 F | HEART RATE: 96 BPM | SYSTOLIC BLOOD PRESSURE: 133 MMHG | OXYGEN SATURATION: 98 %

## 2018-12-19 LAB
ANION GAP SERPL CALC-SCNC: 12 MMOL/L — SIGNIFICANT CHANGE UP (ref 5–17)
BUN SERPL-MCNC: 4 MG/DL — LOW (ref 7–23)
CALCIUM SERPL-MCNC: 8.5 MG/DL — SIGNIFICANT CHANGE UP (ref 8.5–10.1)
CHLORIDE SERPL-SCNC: 104 MMOL/L — SIGNIFICANT CHANGE UP (ref 96–108)
CO2 SERPL-SCNC: 23 MMOL/L — SIGNIFICANT CHANGE UP (ref 22–31)
CREAT SERPL-MCNC: 0.63 MG/DL — SIGNIFICANT CHANGE UP (ref 0.5–1.3)
GLUCOSE SERPL-MCNC: 114 MG/DL — HIGH (ref 70–99)
HCT VFR BLD CALC: 39.1 % — SIGNIFICANT CHANGE UP (ref 34.5–45)
HGB BLD-MCNC: 12.8 G/DL — SIGNIFICANT CHANGE UP (ref 11.5–15.5)
MAGNESIUM SERPL-MCNC: 2.4 MG/DL — SIGNIFICANT CHANGE UP (ref 1.6–2.6)
MCHC RBC-ENTMCNC: 32.7 GM/DL — SIGNIFICANT CHANGE UP (ref 32–36)
MCHC RBC-ENTMCNC: 33.3 PG — SIGNIFICANT CHANGE UP (ref 27–34)
MCV RBC AUTO: 101.8 FL — HIGH (ref 80–100)
NRBC # BLD: 0 /100 WBCS — SIGNIFICANT CHANGE UP (ref 0–0)
PHOSPHATE SERPL-MCNC: 2.5 MG/DL — SIGNIFICANT CHANGE UP (ref 2.5–4.5)
PLATELET # BLD AUTO: 169 K/UL — SIGNIFICANT CHANGE UP (ref 150–400)
POTASSIUM SERPL-MCNC: 3.6 MMOL/L — SIGNIFICANT CHANGE UP (ref 3.5–5.3)
POTASSIUM SERPL-SCNC: 3.6 MMOL/L — SIGNIFICANT CHANGE UP (ref 3.5–5.3)
RBC # BLD: 3.84 M/UL — SIGNIFICANT CHANGE UP (ref 3.8–5.2)
RBC # FLD: 14 % — SIGNIFICANT CHANGE UP (ref 10.3–14.5)
SODIUM SERPL-SCNC: 139 MMOL/L — SIGNIFICANT CHANGE UP (ref 135–145)
WBC # BLD: 3.47 K/UL — LOW (ref 3.8–10.5)
WBC # FLD AUTO: 3.47 K/UL — LOW (ref 3.8–10.5)

## 2018-12-19 PROCEDURE — 88305 TISSUE EXAM BY PATHOLOGIST: CPT | Mod: 26

## 2018-12-19 PROCEDURE — 88312 SPECIAL STAINS GROUP 1: CPT | Mod: 26

## 2018-12-19 PROCEDURE — 99238 HOSP IP/OBS DSCHRG MGMT 30/<: CPT

## 2018-12-19 RX ORDER — PANTOPRAZOLE SODIUM 20 MG/1
1 TABLET, DELAYED RELEASE ORAL
Qty: 30 | Refills: 0 | OUTPATIENT
Start: 2018-12-19 | End: 2019-01-17

## 2018-12-19 RX ORDER — SODIUM CHLORIDE 9 MG/ML
1000 INJECTION, SOLUTION INTRAVENOUS
Qty: 0 | Refills: 0 | Status: DISCONTINUED | OUTPATIENT
Start: 2018-12-19 | End: 2018-12-19

## 2018-12-19 RX ORDER — FOLIC ACID 0.8 MG
1 TABLET ORAL
Qty: 0 | Refills: 0 | DISCHARGE
Start: 2018-12-19

## 2018-12-19 RX ORDER — THIAMINE MONONITRATE (VIT B1) 100 MG
1 TABLET ORAL
Qty: 30 | Refills: 0 | OUTPATIENT
Start: 2018-12-19 | End: 2019-01-17

## 2018-12-19 RX ORDER — AMLODIPINE BESYLATE 2.5 MG/1
1 TABLET ORAL
Qty: 0 | Refills: 0 | DISCHARGE
Start: 2018-12-19

## 2018-12-19 RX ORDER — ALBUTEROL 90 UG/1
2 AEROSOL, METERED ORAL
Qty: 1 | Refills: 0 | OUTPATIENT
Start: 2018-12-19 | End: 2019-01-17

## 2018-12-19 RX ORDER — PANTOPRAZOLE SODIUM 20 MG/1
40 TABLET, DELAYED RELEASE ORAL
Qty: 0 | Refills: 0 | Status: DISCONTINUED | OUTPATIENT
Start: 2018-12-19 | End: 2018-12-19

## 2018-12-19 RX ORDER — NICOTINE POLACRILEX 2 MG
7 GUM BUCCAL
Qty: 0 | Refills: 0 | COMMUNITY
Start: 2018-12-19

## 2018-12-19 RX ADMIN — Medication 100 MILLIGRAM(S): at 11:09

## 2018-12-19 RX ADMIN — PANTOPRAZOLE SODIUM 10 MG/HR: 20 TABLET, DELAYED RELEASE ORAL at 08:45

## 2018-12-19 RX ADMIN — Medication 1 TABLET(S): at 11:09

## 2018-12-19 RX ADMIN — Medication 1 MILLIGRAM(S): at 11:09

## 2018-12-19 RX ADMIN — AMLODIPINE BESYLATE 5 MILLIGRAM(S): 2.5 TABLET ORAL at 05:14

## 2018-12-19 RX ADMIN — SODIUM CHLORIDE 100 MILLILITER(S): 9 INJECTION, SOLUTION INTRAVENOUS at 08:52

## 2018-12-19 RX ADMIN — Medication 1 PATCH: at 11:09

## 2018-12-19 RX ADMIN — PANTOPRAZOLE SODIUM 10 MG/HR: 20 TABLET, DELAYED RELEASE ORAL at 06:10

## 2018-12-19 NOTE — DISCHARGE NOTE ADULT - PATIENT PORTAL LINK FT
You can access the [facility] Patient Portal, offered by [facility], by registering with the following website: [email or web address]

## 2018-12-19 NOTE — DISCHARGE NOTE ADULT - HOSPITAL COURSE
Patient admitted with hematemesis at home.  She was started on IV pantoprazole gtt and seen by GI, Dr. Adams.    Vital Signs Last 24 Hrs  T(C): 36.8 (19 Dec 2018 10:06), Max: 37.1 (19 Dec 2018 09:33)  T(F): 98.2 (19 Dec 2018 10:06), Max: 98.8 (19 Dec 2018 09:33)  HR: 93 (19 Dec 2018 10:06) (78 - 127)  BP: 123/71 (19 Dec 2018 10:06) (123/71 - 143/76)  BP(mean): --  RR: 17 (19 Dec 2018 10:06) (12 - 20)  SpO2: 98% (19 Dec 2018 10:06) (96% - 100%)    GENERAL: NAD.  CHEST/LUNG: Clear to auscultation; No rales, rhonchi, or wheezing.  Respiratory effort does not appear labored.  HEART: Regular rate and rhythm; S1 and S2,  no murmurs, rubs, or gallops.  ABDOMEN: Soft, not tender to palpation.  No masses or HSM appreciated.  No distension.  Bowel sounds present.  EXTREMITIES:  No clubbing, cyanosis, or edema.  Moves all extremities with strength 5/5.  SKIN: No obvious rashes or lesions.  Turgor okay.  NEURO:  Alert and oriented x 3, no focal sensory or  motor deficit, DTR 2+ bilaterally. Patient admitted with hematemesis at home.  She was started on IV pantoprazole gtt and seen by GI, Dr. Adams.  No hematemesis or bloody/dark stool noted in the hospital.  Hemoglobin remained within normal range.  EGD done 12/19 showed gastritis.  Patient tolerated food without N/V or abdominal pain after EGD and was cleared for discharge by Dr. Adams.  Patient to continue with Protonix.  She was advised to stop alcohol use and avoid NSAIDs.    Vital Signs Last 24 Hrs  T(C): 36.8 (19 Dec 2018 10:06), Max: 37.1 (19 Dec 2018 09:33)  T(F): 98.2 (19 Dec 2018 10:06), Max: 98.8 (19 Dec 2018 09:33)  HR: 93 (19 Dec 2018 10:06) (78 - 127)  BP: 123/71 (19 Dec 2018 10:06) (123/71 - 143/76)  BP(mean): --  RR: 17 (19 Dec 2018 10:06) (12 - 20)  SpO2: 98% (19 Dec 2018 10:06) (96% - 100%)    GENERAL: NAD.  CHEST/LUNG: Clear to auscultation; No rales, rhonchi, or wheezing.  Respiratory effort does not appear labored.  HEART: Regular rate and rhythm; S1 and S2,  no murmurs, rubs, or gallops.  ABDOMEN: Soft, not tender to palpation.  No masses or HSM appreciated.  No distension.  Bowel sounds present.  EXTREMITIES:  No clubbing, cyanosis, or edema.  Moves all extremities with strength 5/5.  SKIN: No obvious rashes or lesions.  Turgor okay.  NEURO:  Alert and oriented x 3, no focal sensory or  motor deficit, DTR 2+ bilaterally.

## 2018-12-19 NOTE — DISCHARGE NOTE ADULT - MEDICATION SUMMARY - MEDICATIONS TO TAKE
I will START or STAY ON the medications listed below when I get home from the hospital:    Ventolin HFA 90 mcg/inh inhalation aerosol  -- 2 puff(s) inhaled every 4 hours, As Needed for shortness of breath/wheezing  -- For inhalation only.  It is very important that you take or use this exactly as directed.  Do not skip doses or discontinue unless directed by your doctor.  Obtain medical advice before taking any non-prescription drugs as some may affect the action of this medication.  Shake well before use.    -- Indication: For Asthma    amLODIPine 5 mg oral tablet  -- 1 tab(s) by mouth once a day  -- Indication: For Hypertension    pantoprazole 40 mg oral delayed release tablet  -- 1 tab(s) by mouth once a day (before a meal)  -- Indication: For gastritis    nicotine 7 mg/24 hr transdermal film, extended release  -- 7 milligram(s) by transdermal patch once a day  for 14 days  -- Indication: For smoking cessation    Multiple Vitamins oral tablet  -- 1 tab(s) by mouth once a day  -- Indication: For supplement    folic acid 1 mg oral tablet  -- 1 tab(s) by mouth once a day  -- Indication: For supplement    thiamine 100 mg oral tablet  -- 1 tab(s) by mouth once a day  -- Indication: For supplement

## 2018-12-19 NOTE — DISCHARGE NOTE ADULT - PLAN OF CARE
Avoid further vomiting/stomach pain. Take Protonix daily as prescribed.  Follow-up with gastroenterologist, Dr. Adams, within 7 days.  Avoid alcohol and NSAIDs (e.g. Motrin, Advil, Aleve, ibuprofen). Continue amlodipine.  Follow-up with your primary care provider in 1-2 weeks. Avoid smoking cigarettes.  Use nicotine patch 7 mg per day for another 14 days.  Follow-up with your primary care provider in 1-2 weeks for additional smoking cessation assistance. Continue your outpatient albuterol inhaler as needed.

## 2018-12-19 NOTE — DISCHARGE NOTE ADULT - CARE PLAN
Principal Discharge DX:	Acute alcoholic gastritis without hemorrhage  Goal:	Avoid further vomiting/stomach pain.  Assessment and plan of treatment:	Take Protonix daily as prescribed.  Follow-up with gastroenterologist, Dr. Adams, within 7 days.  Avoid alcohol and NSAIDs (e.g. Motrin, Advil, Aleve, ibuprofen).  Secondary Diagnosis:	Essential hypertension  Assessment and plan of treatment:	Continue amlodipine.  Follow-up with your primary care provider in 1-2 weeks.  Secondary Diagnosis:	Tobacco use  Assessment and plan of treatment:	Avoid smoking cigarettes.  Use nicotine patch 7 mg per day for another 14 days.  Follow-up with your primary care provider in 1-2 weeks for additional smoking cessation assistance.  Secondary Diagnosis:	Mild intermittent asthma without complication  Assessment and plan of treatment:	Continue your outpatient albuterol inhaler as needed.

## 2018-12-19 NOTE — DISCHARGE NOTE ADULT - OTHER SIGNIFICANT FINDINGS
CT ABD/PELVIS  IMPRESSION:    No acute gastrointestinal abnormality.  Fatty liver.  Fibroid uterus. Intrauterine device in place. 4 cm right adnexal cyst for   which nonemergent pelvic ultrasound may be obtained.

## 2018-12-19 NOTE — DISCHARGE NOTE ADULT - CARE PROVIDER_API CALL
Travon Adams), Medicine  [address] [place] [address]  Phone: [phone]  Fax: [phone]    Dr. Elizabeth,   Follow-up with your primary care provider within one week.  Phone: (   )    -  Fax: (   )    -

## 2018-12-19 NOTE — PROGRESS NOTE ADULT - SUBJECTIVE AND OBJECTIVE BOX
Procedure:           Upper GI endoscopy 12-19-18 @ 08:48    Indications:                     Monitored Anesthesia Care Provided by :     ____________________________________________________________________________________________________  Procedure:           Pre-Anesthesia Assessment:                       - Prior to the procedure, a History and Physical was performed, and patient                        medications and allergies were reviewed. The patient is competent. The risks                        and benefits of the procedure and the sedation options and risks were                        discussed with the patient. All questions were answered and informed consent                        was obtained. Patient identification and proposed procedure were verified by                        the physician, the nurse and the anesthesiologist in the procedure room.                        Mental Status Examination:   alert and oriented. Airway Examination: normal                        oropharyngeal airway and neck mobility. Respiratory Examination: clear to                        auscultation. CV Examination: normal. Prophylactic Antibiotics: -The patient                        does not require prophylactic antibiotics.                        Grade Assessment:    After                        reviewing the risks and benefits, the patient was deemed in satisfactory                        condition to undergo the procedure. The anesthesia plan was to use monitored                        anesthesia care (MAC). Immediately prior to administration of medications,                        the patient was re-assessed for adequacy to receive sedatives. The heart        		     rate, respiratory rate, oxygen saturations, blood pressure, adequacy of                        pulmonary ventilation, and response to care were monitored throughout the                        procedure. The physical status of the patient was re-assessed after the                        procedure.                       After obtaining informed consent, the endoscope was passed under direct                        vision. Throughout the procedure, the patient's blood pressure, pulse, and                        oxygen saturations were monitored continuously. The Endoscope was introduced                        through the mouth, and advanced to the second part of duodenum. Retroflexion was performed in the stomach The upper GI                        endoscopy was accomplished with ease. The patient tolerated the procedure                        well.    ESOPHAGUS: focal area of erythema in distal esophagus s/p biopsy    STOMACH: two focal areas of erythema , linear erythema in antrum s/p biopsy    DUODENUM:   WNL      Assessment : As above    PLAN : Regular diet, PPI, can be d/c'd if patient tolerates diet today. Patient knows to see me n follow up
Patient is a 43y old  Female who presents with a chief complaint of Vomiting blood. (18 Dec 2018 14:31)      INTERVAL HPI/OVERNIGHT EVENTS:    Hematemesis - no N/V since admission.  Abdominal pain appx. 7/10 epigastric.  Tolerating clear liquids.  Reports small brown stool today.    Alcoholism - no signs of withdrawal.    Hypertension - SBPs 130-140.  Denied chest pain or HA.    REVIEW OF SYSTEMS:  Denied acute SOB or cough.    MEDICATIONS  (STANDING):  folic acid 1 milliGRAM(s) Oral daily  multivitamin 1 Tablet(s) Oral daily  nicotine -   7 mG/24Hr(s) Patch 1 patch Transdermal daily  pantoprazole Infusion 8 mG/Hr (10 mL/Hr) IV Continuous <Continuous>  thiamine 100 milliGRAM(s) Oral daily    MEDICATIONS  (PRN):  ALBUTerol    90 MICROgram(s) HFA Inhaler 2 Puff(s) Inhalation every 6 hours PRN Shortness of Breath and/or Wheezing  ondansetron Injectable 4 milliGRAM(s) IV Push every 6 hours PRN Nausea and/or Vomiting      Allergies    codeine (Hives)  Motrin (Angioedema)    Intolerances    Vital Signs Last 24 Hrs  T(C): 37 (18 Dec 2018 10:26), Max: 37.6 (18 Dec 2018 07:04)  T(F): 98.6 (18 Dec 2018 10:26), Max: 99.6 (18 Dec 2018 07:04)  HR: 88 (18 Dec 2018 10:26) (88 - 132)  BP: 144/76 (18 Dec 2018 10:26) (136/92 - 145/74)  BP(mean): --  RR: 18 (18 Dec 2018 10:26) (16 - 18)  SpO2: 98% (18 Dec 2018 10:26) (95% - 99%)    PHYSICAL EXAM:  GENERAL: NAD.  CHEST/LUNG: Clear to auscultation; No rales, rhonchi, or wheezing.  Respiratory effort does not appear labored.  HEART: Regular rate and rhythm; S1 and S2,  no murmurs, rubs, or gallops.  ABDOMEN: Soft, not tender to palpation.  No masses or HSM appreciated.  No distension.  Bowel sounds present.  EXTREMITIES:  No clubbing, cyanosis, or edema.  Moves all extremities with strength 5/5.  SKIN: No obvious rashes or lesions.  Turgor okay.  NEURO:  Alert and oriented x 3, no focal sensory or  motor deficit, DTR 2+ bilaterally.    LABS:                        12.0   3.27  )-----------( 192      ( 18 Dec 2018 07:53 )             36.3     12-18    141  |  107  |  4<L>  ----------------------------<  119<H>  3.3<L>   |  24  |  0.70    Ca    8.2<L>      18 Dec 2018 08:10  Phos  3.0     12-18  Mg     2.1     12-18    TPro  6.7  /  Alb  3.6  /  TBili  1.3<H>  /  DBili  .38<H>  /  AST  112<H>  /  ALT  59  /  AlkPhos  80  12-18    PT/INR - ( 17 Dec 2018 20:49 )   PT: 12.6 sec;   INR: 1.12 ratio         PTT - ( 17 Dec 2018 20:49 )  PTT:24.8 sec    CAPILLARY BLOOD GLUCOSE

## 2018-12-19 NOTE — DISCHARGE NOTE ADULT - PROVIDER TOKENS
TOKEN:'[id number]',FREE:[LAST:[Dr. Elizabeth],PHONE:[(   )    -],FAX:[(   )    -],ADDRESS:[Follow-up with your primary care provider within one week.]]

## 2018-12-19 NOTE — DISCHARGE NOTE ADULT - MEDICATION SUMMARY - MEDICATIONS TO STOP TAKING
I will STOP taking the medications listed below when I get home from the hospital:    Benadryl 25 mg oral capsule  -- 2 cap(s) by mouth 3 times a day as needed for rash or itching MDD:6 tabs  -- May cause drowsiness.  Alcohol may intensify this effect.  Use care when operating dangerous machinery.  Obtain medical advice before taking any non-prescription drugs as some may affect the action of this medication.    predniSONE 20 mg oral tablet  -- 1 tab(s) by mouth once a day for next 5 days  -- It is very important that you take or use this exactly as directed.  Do not skip doses or discontinue unless directed by your doctor.  Obtain medical advice before taking any non-prescription drugs as some may affect the action of this medication.  Take with food or milk.    Pepcid 40 mg oral tablet  -- 1 tab(s) by mouth once a day (at bedtime) for next 7 days  -- It is very important that you take or use this exactly as directed.  Do not skip doses or discontinue unless directed by your doctor.  Obtain medical advice before taking any non-prescription drugs as some may affect the action of this medication.    Levaquin 500 mg oral tablet  -- 1 tab(s) by mouth every 24 hours  -- Avoid prolonged or excessive exposure to direct and/or artificial sunlight while taking this medication.  Do not take dairy products, antacids, or iron preparations within one hour of this medication.  Finish all this medication unless otherwise directed by prescriber.  May cause drowsiness or dizziness.  Medication should be taken with plenty of water.    predniSONE 20 mg oral tablet  -- 3 tab(s) by mouth once a day   -- It is very important that you take or use this exactly as directed.  Do not skip doses or discontinue unless directed by your doctor.  Obtain medical advice before taking any non-prescription drugs as some may affect the action of this medication.  Take with food or milk.

## 2018-12-20 LAB — SURGICAL PATHOLOGY STUDY: SIGNIFICANT CHANGE UP

## 2018-12-26 DIAGNOSIS — K92.2 GASTROINTESTINAL HEMORRHAGE, UNSPECIFIED: ICD-10-CM

## 2018-12-26 DIAGNOSIS — Z88.5 ALLERGY STATUS TO NARCOTIC AGENT: ICD-10-CM

## 2018-12-26 DIAGNOSIS — R00.0 TACHYCARDIA, UNSPECIFIED: ICD-10-CM

## 2018-12-26 DIAGNOSIS — K76.0 FATTY (CHANGE OF) LIVER, NOT ELSEWHERE CLASSIFIED: ICD-10-CM

## 2018-12-26 DIAGNOSIS — E51.9 THIAMINE DEFICIENCY, UNSPECIFIED: ICD-10-CM

## 2018-12-26 DIAGNOSIS — Z88.8 ALLERGY STATUS TO OTHER DRUGS, MEDICAMENTS AND BIOLOGICAL SUBSTANCES: ICD-10-CM

## 2018-12-26 DIAGNOSIS — J45.20 MILD INTERMITTENT ASTHMA, UNCOMPLICATED: ICD-10-CM

## 2018-12-26 DIAGNOSIS — F17.200 NICOTINE DEPENDENCE, UNSPECIFIED, UNCOMPLICATED: ICD-10-CM

## 2018-12-26 DIAGNOSIS — K29.20 ALCOHOLIC GASTRITIS WITHOUT BLEEDING: ICD-10-CM

## 2018-12-26 DIAGNOSIS — F10.20 ALCOHOL DEPENDENCE, UNCOMPLICATED: ICD-10-CM

## 2018-12-26 DIAGNOSIS — I10 ESSENTIAL (PRIMARY) HYPERTENSION: ICD-10-CM

## 2019-10-07 NOTE — ED ADULT NURSE NOTE - CHPI ED SYMPTOMS NEG
[FreeTextEntry1] : Pt is a 42 yo M with PMH asthma\par Referred by Dr. Zarco for evaluation of asthma. no body aches

## 2020-02-11 ENCOUNTER — OUTPATIENT (OUTPATIENT)
Dept: OUTPATIENT SERVICES | Facility: HOSPITAL | Age: 45
LOS: 1 days | Discharge: HOME | End: 2020-02-11
Payer: MEDICAID

## 2020-02-11 DIAGNOSIS — Z00.00 ENCOUNTER FOR GENERAL ADULT MEDICAL EXAMINATION WITHOUT ABNORMAL FINDINGS: ICD-10-CM

## 2020-02-11 DIAGNOSIS — Z98.890 OTHER SPECIFIED POSTPROCEDURAL STATES: Chronic | ICD-10-CM

## 2020-02-11 DIAGNOSIS — J20.9 ACUTE BRONCHITIS, UNSPECIFIED: ICD-10-CM

## 2020-02-11 PROBLEM — J45.20 MILD INTERMITTENT ASTHMA, UNCOMPLICATED: Chronic | Status: ACTIVE | Noted: 2018-12-18

## 2020-02-11 LAB
ALBUMIN SERPL ELPH-MCNC: 3.6 G/DL — SIGNIFICANT CHANGE UP (ref 3.3–5)
ALP SERPL-CCNC: 100 U/L — SIGNIFICANT CHANGE UP (ref 40–120)
ALT FLD-CCNC: 55 U/L — SIGNIFICANT CHANGE UP (ref 12–78)
ANION GAP SERPL CALC-SCNC: 10 MMOL/L — SIGNIFICANT CHANGE UP (ref 5–17)
AST SERPL-CCNC: 45 U/L — HIGH (ref 15–37)
BILIRUB SERPL-MCNC: 0.5 MG/DL — SIGNIFICANT CHANGE UP (ref 0.2–1.2)
BUN SERPL-MCNC: 8 MG/DL — SIGNIFICANT CHANGE UP (ref 7–23)
CALCIUM SERPL-MCNC: 9 MG/DL — SIGNIFICANT CHANGE UP (ref 8.5–10.1)
CHLORIDE SERPL-SCNC: 101 MMOL/L — SIGNIFICANT CHANGE UP (ref 96–108)
CHOLEST SERPL-MCNC: 222 MG/DL — HIGH (ref 10–199)
CO2 SERPL-SCNC: 25 MMOL/L — SIGNIFICANT CHANGE UP (ref 22–31)
CREAT SERPL-MCNC: 0.67 MG/DL — SIGNIFICANT CHANGE UP (ref 0.5–1.3)
GLUCOSE SERPL-MCNC: 301 MG/DL — HIGH (ref 70–99)
HBA1C BLD-MCNC: 12.1 % — HIGH (ref 4–5.6)
HCT VFR BLD CALC: 40.5 % — SIGNIFICANT CHANGE UP (ref 34.5–45)
HDLC SERPL-MCNC: 42 MG/DL — LOW
HGB BLD-MCNC: 13.9 G/DL — SIGNIFICANT CHANGE UP (ref 11.5–15.5)
LIPID PNL WITH DIRECT LDL SERPL: 101 MG/DL — HIGH
MCHC RBC-ENTMCNC: 33.7 PG — SIGNIFICANT CHANGE UP (ref 27–34)
MCHC RBC-ENTMCNC: 34.3 GM/DL — SIGNIFICANT CHANGE UP (ref 32–36)
MCV RBC AUTO: 98.3 FL — SIGNIFICANT CHANGE UP (ref 80–100)
NRBC # BLD: 0 /100 WBCS — SIGNIFICANT CHANGE UP (ref 0–0)
PLATELET # BLD AUTO: 226 K/UL — SIGNIFICANT CHANGE UP (ref 150–400)
POTASSIUM SERPL-MCNC: 3.8 MMOL/L — SIGNIFICANT CHANGE UP (ref 3.5–5.3)
POTASSIUM SERPL-SCNC: 3.8 MMOL/L — SIGNIFICANT CHANGE UP (ref 3.5–5.3)
PROT SERPL-MCNC: 7.6 GM/DL — SIGNIFICANT CHANGE UP (ref 6–8.3)
RBC # BLD: 4.12 M/UL — SIGNIFICANT CHANGE UP (ref 3.8–5.2)
RBC # FLD: 13.2 % — SIGNIFICANT CHANGE UP (ref 10.3–14.5)
SODIUM SERPL-SCNC: 136 MMOL/L — SIGNIFICANT CHANGE UP (ref 135–145)
TOTAL CHOLESTEROL/HDL RATIO MEASUREMENT: 5.3 RATIO — SIGNIFICANT CHANGE UP (ref 3.3–7.1)
TRIGL SERPL-MCNC: 395 MG/DL — HIGH (ref 10–149)
WBC # BLD: 4.69 K/UL — SIGNIFICANT CHANGE UP (ref 3.8–10.5)
WBC # FLD AUTO: 4.69 K/UL — SIGNIFICANT CHANGE UP (ref 3.8–10.5)

## 2020-02-11 PROCEDURE — 71046 X-RAY EXAM CHEST 2 VIEWS: CPT | Mod: 26

## 2020-12-08 ENCOUNTER — EMERGENCY (EMERGENCY)
Facility: HOSPITAL | Age: 45
LOS: 1 days | Discharge: HOME | End: 2020-12-08
Attending: STUDENT IN AN ORGANIZED HEALTH CARE EDUCATION/TRAINING PROGRAM | Admitting: STUDENT IN AN ORGANIZED HEALTH CARE EDUCATION/TRAINING PROGRAM
Payer: MEDICAID

## 2020-12-08 VITALS
DIASTOLIC BLOOD PRESSURE: 87 MMHG | SYSTOLIC BLOOD PRESSURE: 148 MMHG | HEIGHT: 63 IN | HEART RATE: 96 BPM | TEMPERATURE: 97 F | RESPIRATION RATE: 16 BRPM | OXYGEN SATURATION: 100 %

## 2020-12-08 DIAGNOSIS — Z98.890 OTHER SPECIFIED POSTPROCEDURAL STATES: Chronic | ICD-10-CM

## 2020-12-08 PROCEDURE — 99283 EMERGENCY DEPT VISIT LOW MDM: CPT

## 2020-12-08 RX ORDER — CYCLOBENZAPRINE HYDROCHLORIDE 10 MG/1
1 TABLET, FILM COATED ORAL
Qty: 15 | Refills: 0
Start: 2020-12-08 | End: 2020-12-12

## 2020-12-08 RX ORDER — GABAPENTIN 400 MG/1
1 CAPSULE ORAL
Qty: 90 | Refills: 0
Start: 2020-12-08 | End: 2021-01-06

## 2020-12-08 RX ORDER — GABAPENTIN 400 MG/1
100 CAPSULE ORAL ONCE
Refills: 0 | Status: COMPLETED | OUTPATIENT
Start: 2020-12-08 | End: 2020-12-08

## 2020-12-08 RX ORDER — CYCLOBENZAPRINE HYDROCHLORIDE 10 MG/1
10 TABLET, FILM COATED ORAL ONCE
Refills: 0 | Status: COMPLETED | OUTPATIENT
Start: 2020-12-08 | End: 2020-12-08

## 2020-12-08 RX ADMIN — CYCLOBENZAPRINE HYDROCHLORIDE 10 MILLIGRAM(S): 10 TABLET, FILM COATED ORAL at 20:26

## 2020-12-08 RX ADMIN — GABAPENTIN 100 MILLIGRAM(S): 400 CAPSULE ORAL at 20:22

## 2020-12-08 NOTE — ED PROVIDER NOTE - NSFOLLOWUPCLINICS_GEN_ALL_ED_FT
[facility] Orthopedic Surgery  Orthopedic Surgery  [address], [address]rd & [address]th [place] [address]  Phone: [phone]  Fax:   Follow Up Time:     [facility]  Neurology  [address] - [address]rd [place] [address]  Phone: [phone]  Fax:   Follow Up Time:

## 2020-12-08 NOTE — ED PROVIDER NOTE - NSFOLLOWUPINSTRUCTIONS_ED_ALL_ED_FT
You were evaluated in the Emergency Department for left thigh pain.  You were evaluated and examined by a physician, and you based on your evaluation, there are no signs of emergency conditions requiring admission to the hospital on today's workup.        We recommend that you:  1. See a neurologist within the next week for follow up.  Bring a copy of your discharge paperwork (including any test results) to your doctor.  2. A medication was sent to your pharmacy for neuropathic pain; take it 3 times a day.        SEEK IMMEDIATE MEDICAL CARE IF YOU HAVE ANY OF THE FOLLOWING SYMPTOMS: bowel or bladder control problems, unusual weakness or numbness in your arms or legs, nausea or vomiting, abdominal pain, fever, dizziness/lightheadedness.

## 2020-12-08 NOTE — ED PROVIDER NOTE - NS ED ROS FT
General: denies fever, chills  HENT: denies nasal congestion, rhinorrhea  Eyes: denies visual changes, blurred vision  CV: denies chest pain, palpitations  Resp: denies difficulty breathing, cough  Abdominal: denies nausea, vomiting, diarrhea, abdominal pain  : denies urinary pain or discharge  MSK: left thigh pain  Neuro: denies headaches, numbness, tingling  Skin: denies rashes, bruises

## 2020-12-08 NOTE — ED PROVIDER NOTE - PATIENT PORTAL LINK FT
You can access the FollowMyHealth Patient Portal offered by [facility] by registering at the following website: [email or web address]. By joining [employer]’s FollowMyHealth portal, you will also be able to view your health information using other applications (apps) compatible with our system.

## 2020-12-08 NOTE — ED PROVIDER NOTE - PHYSICAL EXAMINATION
GENERAL: well appearing in no acute distress, non-toxic appearing  HEAD: normocephalic, atraumatic  HENT: airway intact; neck supple  EYES: normal conjunctiva  CARDIAC: regular rate and rhythm, normal S1S2, no appreciable murmurs, 2+ pulses in UE/LE b/l  PULM: normal breath sounds, clear to ascultation bilaterally, no rales, rhonchi, wheezing  GI: abdomen nondistended, soft, nontender, no guarding, rebound tenderness  NEURO: no focal motor or sensory deficits  MSK: flexing of left knee causes pain + paresthesias over anterior thigh  SKIN: well-perfused, extremities warm, no visible rashes  PSYCH: appropriate mood and affect

## 2020-12-08 NOTE — ED PROVIDER NOTE - CLINICAL SUMMARY MEDICAL DECISION MAKING FREE TEXT BOX
44yo F coming in w/ left anterior-lateral thigh pain that has been intermittent for 4 months. Given location and description of symptoms, likely secondary to meralgia parasthetica. Inconsistent with cord compression given lack of red flags and location of pain; not likely sciatica or disc herniation given anterior location of pain and lack of sacral lumbar pain. 44yo F coming in w/ left anterior-lateral thigh pain that has been intermittent for 4 months. Given location and description of symptoms, likely secondary to meralgia parasthetica. Inconsistent with cord compression given lack of red flags and location of pain; not likely sciatica or disc herniation given anterior location of pain and lack of sacral lumbar pain. pt w/ negative xrays in october from PCP
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
## 2020-12-08 NOTE — ED PROVIDER NOTE - ATTENDING CONTRIBUTION TO CARE
44 y/o F with PMH HTN,DM, HLD p/w left thigh pain. Pt states she has had left thigh pain for the last 4 months. The pain is worse with movement and palpation. She denies trauma to the area. She denies numbness , weakness. She was seen by her PMD and had xray which was normal . She is able to walk. She denies fever, chills. She has mild paresthesia over the left thigh   denies fever, chills, chest pain, SOB, abdominal pain, diarrhea, dysuria, syncope, bleeding, new rash,weakness, numbness, blurred vision    ROS  otherwise negative as per HPI  Gen: Awake, Alert, WD, WN, NAD  Head:  NC/AT  Eyes:  PERRL, EOMI, Conjunctiva pink, lids normal, no scleral icterus  ENT: moist mucus membranes  Neck: supple, nontender, no meningismus, no JVD, trachea midline  Cardiac/CV:  S1 S2, RRR, no M/G/R  Respiratory/Pulm:  CTAB, good air movement, normal resp effort, no wheezes/stridor/retractions/rales/rhonchi  Gastrointestinal/Abdomen:  Soft, nontender, nondistended, +BS, no rebound/guarding  Back:  no CVAT, no MLT. mild pain paraspinal left sided   Ext:  warm, well perfused, moving all extremities spontaneously, no peripheral edema, distal pulses intact  Skin: intact, no rash  Neuro:  AAOx3, sensation intact, motor 5/5 x 4 extremities, normal gait, speech clear

## 2020-12-08 NOTE — ED PROVIDER NOTE - PROGRESS NOTE DETAILS
Juan Luis, PGY1: Discussed w/ patient that symptoms seem very likely neuropathic although there may be a spinal component to it as well. Well prescribe gabapentin + cyclobenzapine. Patient is aware of the side effects and is aware that she should avoid alcohol and operating heavy machinery (like driving). Will provide neurology and ortho spine f/u

## 2020-12-08 NOTE — ED ADULT TRIAGE NOTE - INTERNATIONAL TRAVEL
Progress Note    Patient: Linnea Ruiz 17 year old    MRN#: [id number]    Subjective:     Chief Complaint: \"I really need my Vyavnse.\"    - Review of treatment notes indicate that the patient presented on Thursday with a somewhat anxious affect.  She reported having a difficult evening and found herself getting irritated about small things.  She expressed frustration with being behind in her schoolwork and also talked about her parents having little trust in her.  She received support and encouragement from peers.  She was somewhat immature in her process and her interactions, but engaged appropriately and participated throughout the day.  She denied any safety concerns upon returning home.  The patient then did not present for treatment on Friday.    - Today the patient reports that she is really struggling to stay focused and to avoid distraction since stopping her Vyvanse.  She noted that she feels restless and would like the help of some fidget toys.  This provider escorted her to the low stimulus room , where she was able to select some fidget toys to take back to the group room.  The patient then reported that she has been feeling more anxious, despite resuming her Lexapro and Intuniv.  We discussed that due to her symptoms of ADHD, she is likely experiencing higher anxiety, especially as she thinks about going back to school while she unmedicated.  The patient was reassured that upon resuming Vyvanse many of the symptoms will resolve and anxiety will likely decrease.  We agreed and was willing to use coping skills to manage anxiety in the mean time.  She then reported that she worked at her new job over the past three days and that is why she missed group Friday.  Overall she felt her new job went well.  She reported that her goal for therapy is to focus on enhancing self esteem, noting that she was feeling sad over the weekend after getting messages on social media that she is fat.  She was able to identify  positive aspects of herself, noting that she is smart, has pretty eyes and is unique. She denied any safety concerns.     Review of Systems: negative      Meds:  Lexapro 10mg  Intuniv 1mg  hold Vyvanse    Allergies:  Aloe  Coconut    Vitals: Not conducted this visit.    Objective:      Mental Status Exam:     Casually dressed, well groomed.  AO3.  Eye contact intermittent.  Speech is spontaneous, normal in RRV.  Thought process logical/linear.  Abnormal perceptions:  None.  Associations:  Intact.  Thought Content: No evidence of AVH or delusions.  No HI, SI.  Mood \"Fidgety\" and affect is mildly anxious.  Insight:fair, as evidenced by patient's insight into her own illness  Judgment: fair, as evidenced by engagement in treatment  Attention span:  fair.  Short term and long term memory is intact based on consistent recall of recent events over course of interview.  Intelligence and use of language appear age appropriate.  Gait and Station:  Normal.  Motor:  No agitation/abnormal movements.      Assessment and Plan  The patient is actively engaged in treatment and interacting appropriately with peers.  She reports significant struggle with ADHD symptoms in the setting of not being able to take her Vyvanse.  This is resulting in high levels of anxiety however the patient is willing to utilize fidget toys and other coping skills to help manage anxiety until she can resume her medication.  In the meantime the patient has resumed her Lexapro and Intuniv and appears to be tolerating these medications well.  She reports ongoing issues with low self-esteem that served to decrease her mood but is willing to establish goals and treatment of identifying positive aspects of herself in an effort to enhance a sting and functioning.  Patient denies any acute safety concerns and is motivated for ongoing treatment.    Diagnoses:  Post Traumatic Stress Disorder  Attention Deficit Hyperactivity Disorder, Combined Type    1. Patient does  not pose any imminent threat of harm to self or others and so will be maintained in PHP.  2. Patient will continue to undergo daily monitoring and evaluation  3. Diagnostic impression, prognosis and treatment options were discussed with patient.  4. After a review of indication, alternatives, risks and benefits the patient agreed to continue current medications.  5. Medical consult was completed.  patient endorsed a history of migraine headaches and was encouraged to increase hydration and to avoid caffeine.  She was advised to try Excedrin migraine for headaches in the future.  No other significant concerns identified.  6. Patient will be encouraged to continue participation in adolescent milieu with therapeutic activities, individual and family therapy.   7. Case management services assist with appropriate aftercare plan.      Estimated length of stay: 5-7 days.     Goals for Discharge:  Behaviors/symptoms leading to self-harm/violence no longer present.  Improved coping.  Patient and family education.  Appropriate discharge planning.         Roxann Ya DO  Child and Adolescent Psychiatrist                 No

## 2020-12-08 NOTE — ED PROVIDER NOTE - OBJECTIVE STATEMENT
44yo F coming in w/ left hip w/ radiation down anterior-lateral thigh pain that has gotten progressively worse. States it is more common when she moves her left leg and is associated w/ numbness/tingling sensation over the left hip radiating down  anterior-lateral thigh. Denies taking any medication for the pain due to "high elevated enzymes" and allergy to motrin (described as itchiness). States she went to her PMD who scheduled an xray of the hip in September that was subsequently negative. She denies any trauma, fever/chills, loss of bowel/bladder control, urinary pain/frequency or diarrhea.

## 2020-12-08 NOTE — ED PROVIDER NOTE - PMH
Mild intermittent asthma without complication     Diabetes    Hypertension    Mild intermittent asthma without complication

## 2021-01-20 ENCOUNTER — RESULT REVIEW (OUTPATIENT)
Age: 46
End: 2021-01-20

## 2021-01-20 ENCOUNTER — APPOINTMENT (OUTPATIENT)
Dept: ORTHOPEDIC SURGERY | Facility: HOSPITAL | Age: 46
End: 2021-01-20

## 2021-01-20 ENCOUNTER — OUTPATIENT (OUTPATIENT)
Dept: OUTPATIENT SERVICES | Facility: HOSPITAL | Age: 46
LOS: 1 days | End: 2021-01-20
Payer: MEDICAID

## 2021-01-20 VITALS
DIASTOLIC BLOOD PRESSURE: 87 MMHG | BODY MASS INDEX: 30.91 KG/M2 | SYSTOLIC BLOOD PRESSURE: 135 MMHG | HEART RATE: 91 BPM | HEIGHT: 62 IN | TEMPERATURE: 97 F | WEIGHT: 168 LBS

## 2021-01-20 DIAGNOSIS — M25.559 PAIN IN UNSPECIFIED HIP: ICD-10-CM

## 2021-01-20 DIAGNOSIS — M79.609 PAIN IN UNSPECIFIED LIMB: ICD-10-CM

## 2021-01-20 DIAGNOSIS — Z98.890 OTHER SPECIFIED POSTPROCEDURAL STATES: Chronic | ICD-10-CM

## 2021-01-20 PROBLEM — I10 ESSENTIAL (PRIMARY) HYPERTENSION: Chronic | Status: ACTIVE | Noted: 2020-12-08

## 2021-01-20 PROBLEM — E11.9 TYPE 2 DIABETES MELLITUS WITHOUT COMPLICATIONS: Chronic | Status: ACTIVE | Noted: 2020-12-08

## 2021-01-20 PROCEDURE — 73502 X-RAY EXAM HIP UNI 2-3 VIEWS: CPT | Mod: 26,LT

## 2021-01-20 PROCEDURE — G0463: CPT

## 2021-01-20 PROCEDURE — 73502 X-RAY EXAM HIP UNI 2-3 VIEWS: CPT

## 2021-01-20 RX ORDER — KETOCONAZOLE 20.5 MG/ML
2 SHAMPOO, SUSPENSION TOPICAL
Qty: 1 | Refills: 11 | Status: DISCONTINUED | COMMUNITY
Start: 2018-03-27 | End: 2021-01-20

## 2021-01-20 RX ORDER — HYDROCORTISONE 25 MG/G
2.5 CREAM TOPICAL
Qty: 1 | Refills: 0 | Status: DISCONTINUED | COMMUNITY
Start: 2018-03-27 | End: 2021-01-20

## 2021-01-28 ENCOUNTER — APPOINTMENT (OUTPATIENT)
Dept: NEUROLOGY | Facility: CLINIC | Age: 46
End: 2021-01-28
Payer: MEDICAID

## 2021-01-28 VITALS
SYSTOLIC BLOOD PRESSURE: 145 MMHG | DIASTOLIC BLOOD PRESSURE: 91 MMHG | BODY MASS INDEX: 30.91 KG/M2 | HEART RATE: 90 BPM | WEIGHT: 168 LBS | HEIGHT: 62 IN

## 2021-01-28 DIAGNOSIS — R73.03 PREDIABETES.: ICD-10-CM

## 2021-01-28 DIAGNOSIS — M25.559 PAIN IN UNSPECIFIED HIP: ICD-10-CM

## 2021-01-28 DIAGNOSIS — Z82.69 FAMILY HISTORY OF OTHER DISEASES OF THE MUSCULOSKELETAL SYSTEM AND CONNECTIVE TISSUE: ICD-10-CM

## 2021-01-28 DIAGNOSIS — Z78.9 OTHER SPECIFIED HEALTH STATUS: ICD-10-CM

## 2021-01-28 DIAGNOSIS — Z86.79 PERSONAL HISTORY OF OTHER DISEASES OF THE CIRCULATORY SYSTEM: ICD-10-CM

## 2021-01-28 DIAGNOSIS — Z87.39 PERSONAL HISTORY OF OTHER DISEASES OF THE MUSCULOSKELETAL SYSTEM AND CONNECTIVE TISSUE: ICD-10-CM

## 2021-01-28 PROCEDURE — 99204 OFFICE O/P NEW MOD 45 MIN: CPT

## 2021-01-28 PROCEDURE — 99072 ADDL SUPL MATRL&STAF TM PHE: CPT

## 2021-01-28 RX ORDER — METFORMIN HYDROCHLORIDE 500 MG/1
500 TABLET, COATED ORAL
Refills: 0 | Status: ACTIVE | COMMUNITY

## 2021-01-28 RX ORDER — MELOXICAM 7.5 MG/1
7.5 TABLET ORAL TWICE DAILY
Qty: 42 | Refills: 0 | Status: DISCONTINUED | COMMUNITY
Start: 2021-01-20 | End: 2021-01-28

## 2021-01-28 RX ORDER — AMLODIPINE BESYLATE 5 MG/1
5 TABLET ORAL
Refills: 0 | Status: ACTIVE | COMMUNITY

## 2021-01-28 NOTE — PHYSICAL EXAM
[General Appearance - Alert] : alert [Oriented To Time, Place, And Person] : oriented to person, place, and time [Person] : oriented to person [Place] : oriented to place [Time] : oriented to time [Short Term Intact] : short term memory intact [Fluency] : fluency intact [Current Events] : adequate knowledge of current events [Cranial Nerves Optic (II)] : visual acuity intact bilaterally,  visual fields full to confrontation, pupils equal round and reactive to light [Cranial Nerves Oculomotor (III)] : extraocular motion intact [Cranial Nerves Vestibulocochlear (VIII)] : hearing was intact bilaterally [Cranial Nerves Accessory (XI - Cranial And Spinal)] : head turning and shoulder shrug symmetric [Motor Tone] : muscle tone was normal in all four extremities [Motor Strength] : muscle strength was normal in all four extremities [Sensation Tactile Decrease] : light touch was intact [Sensation Pain / Temperature Decrease] : pain and temperature was intact [Abnormal Walk] : normal gait [Coordination - Dysmetria Impaired Finger-to-Nose Bilateral] : not present [1+] : Patella left 1+ [FreeTextEntry7] : Negative straight leg raise.  Positive Joe's maneuver on the left

## 2021-01-28 NOTE — DISCUSSION/SUMMARY
[FreeTextEntry1] : 45-year-old woman with history of hip pain with radiation to the anterior thigh is here for initial consultation.  I think the most likely diagnosis is left hip pathology however given the anterior thigh involvement will check MRI of the lumbar spine to evaluate for left L4 radiculopathy.  She will follow-up with me after the study is completed.  She was also advised to follow-up with orthopedics.\par \par I spent the time noted on the day of this patient encounter preparing for, providing and documenting the above E/M service and counseling and educate patient on differential, workup, disease course, and treatment/management. Education was provided to the patient during this encounter. All questions and concerns were answered and addressed in detail. The patient verbalized understanding and agreed to plan. Patient was advised to continue to monitor for neurologic symptoms and to notify my office or go to the nearest emergency room if there are any changes. Any orders/referrals and communications were provided as well. \par Side effects of the above medications were discussed in detail including but not limited to applicable black box warning and teratogenicity as appropriate. \par Patient was advised to bring previous records to my office. \par \par \par

## 2021-01-28 NOTE — HISTORY OF PRESENT ILLNESS
[FreeTextEntry1] : 45-year-old woman who is here for initial consultation for left hip pain that radiates to the anterior thigh.  Patient states that this has been going on gradually since the summer.  Patient has no trauma or any other triggers.  Patient has a history of Brazilian butt lift however the symptoms preceded the procedure.  Initially patient states that it was difficult to move at the hip.  Now the pain is better.  Occasionally she still has pins-and-needles that goes to the anterior thigh.  She has tried Tylenol but it does not help.  Symptoms can occur from 7 AM to 12 PM and resolves on its own.  It usually occurs about 4 times a week.  The pain is worse with hip abduction and extension at the hip.

## 2021-02-03 ENCOUNTER — RX RENEWAL (OUTPATIENT)
Age: 46
End: 2021-02-03

## 2021-02-10 ENCOUNTER — APPOINTMENT (OUTPATIENT)
Dept: ORTHOPEDIC SURGERY | Facility: HOSPITAL | Age: 46
End: 2021-02-10

## 2021-02-22 ENCOUNTER — APPOINTMENT (OUTPATIENT)
Dept: NEUROLOGY | Facility: CLINIC | Age: 46
End: 2021-02-22

## 2021-05-24 NOTE — ED PEDIATRIC TRIAGE NOTE - MODE OF ARRIVAL
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 Images viewed per Dr LYDIA Encinas; stable findings chronic aortic dissection. DR Encinas recommends f/up in 2 years with CTA Chest abd pelvis.    Updated pt of surgeon review/rec. Discussed importance of bp control and need to report if onset pains to back, chest, abd as this may indicate need for sooner imaging. Discussed importance of continued close f/up with managing physicians Dr MORA rothman, and PCP Dr Laurel Hernandez as per their f/up rec.   
Private Vehicle

## 2021-10-12 NOTE — ED ADULT TRIAGE NOTE - LOCATION:
Pulmonary progress note    84-year-old woman with bronchiectasis, acute hypoxic respiratory failure here for follow-up after exacerbation.  It looks like fluid retention may have been the destabilizing issue.  Sometimes fluid shifts post surgery can destabilize fragile patients.    Recommendations  Continue higher dose of lasix - dose updated in the chart.  Action plan as needed  Continue vest twice daily  Continue current regimen of Trelegy, nebulizers, Atrovent nasal spray  Titrate oxygen down as tolerated  Stay active.    Chief complaint: exacerbation    HPI: 84-year-old woman with a history of shortness of breath, bronchiectasis was admitted with exacerbation recently.  In the setting of shoulder pain due to carrying oxygen.  Also had some extra fluid on board and has improved since this has improved.    History, medications reviewed    On exam  /60   Pulse 75   Wt 67 kg (147 lb 12.8 oz)   SpO2 96%   BMI 27.47 kg/m      Well-appearing, neck supple, bilateral wheezes  Normal cardiac exam  No clubbing  Good strength able to walk around the room independently  Neurologically intact    CXR reviewed and interpreted: no new infiltrates.         
Right arm;

## 2022-03-01 NOTE — ED ADULT NURSE NOTE - RN DISCHARGE SIGNATURE
Patient Education     Viral Syndrome (Child)  A virus is the most common cause of illness among children. This may cause a number of different symptoms, depending on what part of the body is affected. If the virus settles in the nose, throat, and lungs, it causes cough, congestion, and sometimes headache. If it settles in the stomach and intestinal tract, it causes vomiting and diarrhea. Sometimes it causes vague symptoms of \"feeling bad all over,\" with fussiness, poor appetite, poor sleeping, and lots of crying. A light rash may also appear for the first few days, then fade away.  A viral illness usually lasts 3 to 5 days, but sometimes it lasts longer, even up to 1 to 2 weeks. Home measures are all that are needed to treat a viral illness. Antibiotics don't help. Occasionally, a more serious bacterial infection can look like a viral syndrome in the first few days of the illness.   Home care  Follow these guidelines to care for your child at home:  · Fluids. Fever increases water loss from the body. For infants under 1 year old, continue regular feedings (formula or breast). Between feedings give oral rehydration solution, which is available from groceries and drugstores without a prescription. For children older than 1 year, give plenty of fluids like water, juice, ginger ale, lemonade, fruit-based drinks, or popsicles.    · Food. If your child doesn't want to eat solid foods, it's OK for a few days, as long as he or she drinks lots of fluid. (If your child has been diagnosed with a kidney disease, ask your child’s doctor how much and what types of fluids your child should drink to prevent dehydration. If your child has kidney disease, drinking too much fluid can cause it build up in the body and be dangerous to your child’s health.)  · Activity. Keep children with a fever at home resting or playing quietly. Encourage frequent naps. Your child may return to day care or school when the fever is gone and he or she  is eating well and feeling better.  · Sleep. Periods of sleeplessness and irritability are common. Give your child plenty of time to sleep.  ? For children 1 year and older: Have your child sleep in a slightly upright position. This is to help make breathing easier. If possible, raise the head of the bed slightly. Or raise your older child’s head and upper body up with extra pillows. Talk with your healthcare provider about how far to raise your child's head.  ? For babies younger than 12 months:  Never use pillows or put your baby to sleep on their stomach or side. Babies younger than 12 months should sleep on a flat, firm surface on their back. Don't use car seats, strollers, swings, baby carriers, or baby slings for sleep. If your baby falls asleep in one of these, move them to a flat, firm surface as soon as you can.  · Cough. Coughing is a normal part of this illness. A cool mist humidifier at the bedside may be helpful. Over-the-counter (OTC) cough and cold medicine has not been proved to be any more helpful than sweet syrup with no medicine in it. But these medicines can produce serious side effects, especially in infants younger than 2 years. Don’t give OTC cough and cold medicines to children under age 6 years unless your healthcare provider has specifically advised you to do so. Also, don’t expose your child to cigarette smoke. It can make the cough worse.  · Nasal congestion. Suction the nose of infants with a rubber bulb syringe. You may put 2 to 3 drops of saltwater (saline) nose drops in each nostril before suctioning to help remove secretions. Saline nose drops are available without a prescription. You can make it by adding 1/4 teaspoon table salt in 1 cup of water.  · Fever. You may give your child acetaminophen or ibuprofen to control pain and fever, unless another medicine was prescribed for this. If your child has chronic liver or kidney disease or ever had a stomach ulcer or gastrointestinal  bleeding, talk with your healthcare provider before using these medicines. Don't give aspirin to anyone younger than 18 years who is ill with a fever. It may cause severe disease or death.  · Prevention. Wash your hands before and after touching your sick child to help prevent giving a new illness to your child and to prevent spreading this viral illness to yourself and to other children.  Follow-up care  Follow up with your child's healthcare provider as advised.  When to seek medical advice  Unless your child's healthcare provider advises otherwise, call the provider right away if:  · Your child has a fever (see Fever and children, below)  · Your child is fussy or crying and cannot be soothed  · Your child has an earache, sinus pain, stiff or painful neck, or headache  · Your child has increasing abdominal pain or pain that is not getting better after 8 hours  · Your child has repeated diarrhea or vomiting  · A new rash appears  · Your child has signs of dehydration: No wet diapers for 8 hours in infants, little or no urine older children, very dark urine, sunken eyes  · Your child has burning when urinating  Call 911  Call 911 if any of the following occur:  · Lips or skin that turn blue, purple, or gray  · Neck stiffness or rash with a fever  · Convulsion (seizure)  · Wheezing or trouble breathing  · Unusual fussiness or drowsiness  · Confusion  Fever and children  Always use a digital thermometer to check your child’s temperature. Never use a mercury thermometer.  For infants and toddlers, be sure to use a rectal thermometer correctly. A rectal thermometer may accidentally poke a hole in (perforate) the rectum. It may also pass on germs from the stool. Always follow the product maker’s directions for proper use. If you don’t feel comfortable taking a rectal temperature, use another method. When you talk to your child’s healthcare provider, tell him or her which method you used to take your child’s  temperature.  Here are guidelines for fever temperature. Ear temperatures aren’t accurate before 6 months of age. Don’t take an oral temperature until your child is at least 4 years old.  Infant under 3 months old:  · Ask your child’s healthcare provider how you should take the temperature.  · Rectal or forehead (temporal artery) temperature of 100.4°F (38°C) or higher, or as directed by the provider  · Armpit temperature of 99°F (37.2°C) or higher, or as directed by the provider  Child age 3 to 36 months:  · Rectal, forehead (temporal artery), or ear temperature of 102°F (38.9°C) or higher, or as directed by the provider  · Armpit temperature of 101°F (38.3°C) or higher, or as directed by the provider  Child of any age:  · Repeated temperature of 104°F (40°C) or higher, or as directed by the provider  · Fever that lasts more than 24 hours in a child under 2 years old. Or a fever that lasts for 3 days in a child 2 years or older.  [employer] last reviewed this educational content on 4/1/2018  © 0185-6671 [employer]. All rights reserved. This information is not intended as a substitute for professional medical care. Always follow your healthcare professional's instructions.           Patient Education     Teething  Baby (primary) teeth first appear during the first 4 to 9 months of age. The first teeth to appear are usually the 2 bottom front teeth. The next to appear are the upper 4 front teeth. By the third birthday, most children have all their baby teeth (about 20 teeth). Starting around age 6 or 7, baby teeth begin to loosen and fall out. Adult (permanent) teeth grow in their place.   Symptoms  Most teething symptoms are often caused by the mild pain of tooth development. The classic symptoms linked to teething are drooling and putting fingers in the mouth. This is usually true. But, these may also just be signs of normal development. Common teething symptoms include:   · Drooling  · Redness around  the mouth and chin  · Irritability, fussiness, crying  · Rubbing gums  · Biting, chewing  · Not wanting to eat  · Sleep problems  · Ear rubbing  · Low-grade fever (below 100.4°F)  Home care  · Wipe drool away from your baby's face often, so it does not cause a rash.  · Offer a chilled teething ring. Keep these in the refrigerator, not the freezer. They should not be too cold.  · Gently rub or massage your baby’s gums with a clean finger to ease symptoms.  · Give your child a smooth, hard teething ring to bite on. Firm rubber is best. You can also offer a cool, wet washcloth. Don't give your baby anything he or she can swallow, such as beads.  · Follow your healthcare provider’s instructions on using over-the-counter pain medicines such as acetaminophen for fever, fussiness, or discomfort. Don't give ibuprofen to children younger than 6 months old. Don’t give aspirin (or medicine that contains aspirin) to a child younger than age 19 unless directed by your child’s provider. Taking aspirin can put your child at risk for Reye syndrome. This is a rare but very serious disorder. It most often affects the brain and the liver.  · Don't use numbing gels or liquids. These are medicines containing benzocaine. They may give short-term relief, but they can cause a rare but serious and possibly life-threatening illness.    Follow-up care  Follow up with your child’s healthcare provider, or as advised.  When to seek medical advice  Call the healthcare provider right away if:  · Your child has a fever (see \"Fever and children\" below)  · Your child has an earache (he or she pulls at the ear).  · Your child has neck pain or stiffness, or headache.  · Your child has a rash with fever.  · Your child has frequent diarrhea or vomiting.  · Your baby is fussy or cries and can't be soothed.  Fever and children  Always use a digital thermometer to check your child’s temperature. Never use a mercury thermometer.   For infants and toddlers,  be sure to use a rectal thermometer correctly. A rectal thermometer may accidentally poke a hole in (perforate) the rectum. It may also pass on germs from the stool. Always follow the product maker’s directions for proper use. If you don’t feel comfortable taking a rectal temperature, use another method. When you talk to your child’s healthcare provider, tell him or her which method you used to take your child’s temperature.   Here are guidelines for fever temperature. Ear temperatures aren’t accurate before 6 months of age. Don’t take an oral temperature until your child is at least 4 years old.   Infant under 3 months old:  · Ask your child’s healthcare provider how you should take the temperature.  · Rectal or forehead (temporal artery) temperature of 100.4°F (38°C) or higher, or as directed by the provider  · Armpit temperature of 99°F (37.2°C) or higher, or as directed by the provider  Child age 3 to 36 months:  · Rectal, forehead, or ear temperature of 102°F (38.9°C) or higher, or as directed by the provider  · Armpit (axillary) temperature of 101°F (38.3°C) or higher, or as directed by the provider  Child of any age:  · Repeated temperature of 104°F (40°C) or higher, or as directed by the provider  · Fever that lasts more than 24 hours in a child under 2 years old. Or a fever that lasts for 3 days in a child 2 years or older.  [employer] last reviewed this educational content on 4/1/2018 © 2000-2021 [employer]. All rights reserved. This information is not intended as a substitute for professional medical care. Always follow your healthcare professional's instructions.            24-Mar-2017

## 2022-03-03 ENCOUNTER — APPOINTMENT (OUTPATIENT)
Dept: NEUROLOGY | Facility: CLINIC | Age: 47
End: 2022-03-03
Payer: MEDICAID

## 2022-03-03 VITALS
HEIGHT: 62 IN | BODY MASS INDEX: 30.91 KG/M2 | WEIGHT: 168 LBS | DIASTOLIC BLOOD PRESSURE: 88 MMHG | HEART RATE: 98 BPM | SYSTOLIC BLOOD PRESSURE: 139 MMHG

## 2022-03-03 DIAGNOSIS — F17.200 NICOTINE DEPENDENCE, UNSPECIFIED, UNCOMPLICATED: ICD-10-CM

## 2022-03-03 DIAGNOSIS — Z86.39 PERSONAL HISTORY OF OTHER ENDOCRINE, NUTRITIONAL AND METABOLIC DISEASE: ICD-10-CM

## 2022-03-03 DIAGNOSIS — M54.50 LOW BACK PAIN, UNSPECIFIED: ICD-10-CM

## 2022-03-03 DIAGNOSIS — G89.29 LOW BACK PAIN, UNSPECIFIED: ICD-10-CM

## 2022-03-03 DIAGNOSIS — Z72.89 OTHER PROBLEMS RELATED TO LIFESTYLE: ICD-10-CM

## 2022-03-03 DIAGNOSIS — Z80.3 FAMILY HISTORY OF MALIGNANT NEOPLASM OF BREAST: ICD-10-CM

## 2022-03-03 PROCEDURE — 99214 OFFICE O/P EST MOD 30 MIN: CPT

## 2022-03-03 RX ORDER — CYCLOBENZAPRINE HYDROCHLORIDE 5 MG/1
5 TABLET, FILM COATED ORAL 3 TIMES DAILY
Qty: 30 | Refills: 2 | Status: ACTIVE | COMMUNITY
Start: 2022-03-03 | End: 1900-01-01

## 2022-03-03 RX ORDER — SIMVASTATIN 10 MG/1
10 TABLET, FILM COATED ORAL
Refills: 0 | Status: ACTIVE | COMMUNITY
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
## 2022-03-03 NOTE — PHYSICAL EXAM
[General Appearance - Alert] : alert [General Appearance - In No Acute Distress] : in no acute distress [Oriented To Time, Place, And Person] : oriented to person, place, and time [Impaired Insight] : insight and judgment were intact [Affect] : the affect was normal [Person] : oriented to person [Place] : oriented to place [Time] : oriented to time [Concentration Intact] : normal concentrating ability [Visual Intact] : visual attention was ~T not ~L decreased [Naming Objects] : no difficulty naming common objects [Repeating Phrases] : no difficulty repeating a phrase [Writing A Sentence] : no difficulty writing a sentence [Fluency] : fluency intact [Comprehension] : comprehension intact [Reading] : reading intact [Past History] : adequate knowledge of personal past history [Cranial Nerves Optic (II)] : visual acuity intact bilaterally,  visual fields full to confrontation, pupils equal round and reactive to light [Cranial Nerves Oculomotor (III)] : extraocular motion intact [Cranial Nerves Trigeminal (V)] : facial sensation intact symmetrically [Cranial Nerves Facial (VII)] : face symmetrical [Cranial Nerves Vestibulocochlear (VIII)] : hearing was intact bilaterally [Cranial Nerves Glossopharyngeal (IX)] : tongue and palate midline [Cranial Nerves Accessory (XI - Cranial And Spinal)] : head turning and shoulder shrug symmetric [Cranial Nerves Hypoglossal (XII)] : there was no tongue deviation with protrusion [Motor Tone] : muscle tone was normal in all four extremities [No Muscle Atrophy] : normal bulk in all four extremities [Motor Handedness Right-Handed] : the patient is right hand dominant [Motor Strength Shoulders Right Weakness] : normal shoulder strength [Motor Strength Upper Extremities Right] : normal arm strength [Motor Strength Forearms Right Weakness] : normal forearm strength [Hand Weakness Right] : normal hand  [Motor Strength Shoulders Left Weakness] : normal shoulder strength [Motor Strength Upper Extremities Left] : normal arm strength [Motor Strength Forearms Left Weakness] : normal forearm strength [Hand Weakness Left] : normal hand  [-4] : hip flexion -4/5 [4] : hip extension 4/5 [+4] : hip flexion +4/5 [5] : ankle plantar flexion 5/5 [Sensation Tactile Decrease] : light touch was intact [Balance] : balance was intact [Past-pointing] : there was no past-pointing [Tremor] : no tremor present [Coordination - Dysmetria Impaired Finger-to-Nose Bilateral] : not present [1+] : Ankle jerk left 1+ [Plantar Reflex Right Only] : normal on the right [Plantar Reflex Left Only] : normal on the left [FreeTextEntry6] : 5 [Sclera] : the sclera and conjunctiva were normal [PERRL With Normal Accommodation] : pupils were equal in size, round, reactive to light, with normal accommodation [Extraocular Movements] : extraocular movements were intact [Full Visual Field] : full visual field [Outer Ear] : the ears and nose were normal in appearance [Hearing Threshold Finger Rub Not [place]] : hearing was normal [Neck Appearance] : the appearance of the neck was normal [Respiration, Rhythm And Depth] : normal respiratory rhythm and effort [Abnormal Walk] : normal gait [Nail Clubbing] : no clubbing  or cyanosis of the fingernails [Musculoskeletal - Swelling] : no joint swelling seen [FreeTextEntry1] : Positive Joe's maneuver on the right.  [Skin Color & Pigmentation] : normal skin color and pigmentation [] : no rash

## 2022-03-03 NOTE — REASON FOR VISIT
[Initial Eval - Existing Diagnosis] : an initial evaluation of an existing diagnosis [FreeTextEntry1] : lower back pain

## 2022-03-28 ENCOUNTER — OUTPATIENT (OUTPATIENT)
Dept: OUTPATIENT SERVICES | Facility: HOSPITAL | Age: 47
LOS: 1 days | End: 2022-03-28
Payer: MEDICAID

## 2022-03-28 ENCOUNTER — APPOINTMENT (OUTPATIENT)
Dept: MRI IMAGING | Facility: CLINIC | Age: 47
End: 2022-03-28
Payer: MEDICAID

## 2022-03-28 DIAGNOSIS — M54.16 RADICULOPATHY, LUMBAR REGION: ICD-10-CM

## 2022-03-28 DIAGNOSIS — Z00.8 ENCOUNTER FOR OTHER GENERAL EXAMINATION: ICD-10-CM

## 2022-03-28 DIAGNOSIS — M54.50 LOW BACK PAIN, UNSPECIFIED: ICD-10-CM

## 2022-03-28 DIAGNOSIS — Z98.890 OTHER SPECIFIED POSTPROCEDURAL STATES: Chronic | ICD-10-CM

## 2022-03-28 PROCEDURE — 72148 MRI LUMBAR SPINE W/O DYE: CPT | Mod: 26

## 2022-03-28 PROCEDURE — 72148 MRI LUMBAR SPINE W/O DYE: CPT

## 2022-03-30 ENCOUNTER — TRANSCRIPTION ENCOUNTER (OUTPATIENT)
Age: 47
End: 2022-03-30

## 2022-04-15 DIAGNOSIS — M48.061 SPINAL STENOSIS, LUMBAR REGION WITHOUT NEUROGENIC CLAUDICATION: ICD-10-CM

## 2022-11-02 NOTE — ED PROVIDER NOTE - CHIEF COMPLAINT
The patient is a 43y Female complaining of Interval hx:    MEDICATIONS  (STANDING):  enoxaparin Injectable 40 milliGRAM(s) SubCutaneous every 24 hours    MEDICATIONS  (PRN):  cyclobenzaprine 5 milliGRAM(s) Oral three times a day PRN Muscle Spasm    Vital Signs (24 Hrs):  T(C): 36.6 (11-02-22 @ 05:26), Max: 36.6 (11-01-22 @ 13:02)  HR: 72 (11-02-22 @ 05:26) (72 - 88)  BP: 98/60 (11-02-22 @ 05:26) (98/60 - 111/75)  RR: 17 (11-02-22 @ 05:26) (17 - 18)  SpO2: 100% (11-02-22 @ 05:26) (94% - 100%)  Wt(kg): --  Daily     Daily     I&O's Summary    Physical Exam:   Interval hx: Overnight RRT called for pt fall. Pt assessed, VSS, no changes on neuro exam, pt AAOX3, deemed mechanical fall i/s/o pt's neuro deficits.     MEDICATIONS  (STANDING):  enoxaparin Injectable 40 milliGRAM(s) SubCutaneous every 24 hours    MEDICATIONS  (PRN):  cyclobenzaprine 5 milliGRAM(s) Oral three times a day PRN Muscle Spasm    Vital Signs (24 Hrs):  T(C): 36.6 (11-02-22 @ 05:26), Max: 36.6 (11-01-22 @ 13:02)  HR: 72 (11-02-22 @ 05:26) (72 - 88)  BP: 98/60 (11-02-22 @ 05:26) (98/60 - 111/75)  RR: 17 (11-02-22 @ 05:26) (17 - 18)  SpO2: 100% (11-02-22 @ 05:26) (94% - 100%)  Wt(kg): --  Daily     Daily     I&O's Summary    Physical Exam:  Constitutional: NAD, awake and alert  Respiratory: Breath sounds are clear bilaterally, No wheezing, rales or rhonchi  Cardiovascular: S1 and S2, regular rate and rhythm, no Murmurs, gallops or rubs  Gastrointestinal: Bowel Sounds present, soft, nontender, nondistended, no guarding, no rebound  Extremities: No LE edema b/l  Neurological: pt drowiser than usual but oriented x 3, PERRLA, EOMI, 5/5 dorsi/plantarflexion b/l, decreased  strength and 4/5 str arm extension on R side (R side weakness is baseline deficit), normal  str and arm str on L side, Dysmetria on FTN b/l, Gait is unstable with slightly higher steppage in left foot, b/l patellar hyperreflexia [Exam unchanged from prior day]  Musculoskeletal: CLAROS x 4  Skin: No cyanosis or pallor    LABS:  cret                        15.3   8.02  )-----------( 264      ( 02 Nov 2022 02:00 )             45.8     11-02    141  |  106  |  17  ----------------------------<  90  4.3   |  25  |  0.87    Ca    8.8      02 Nov 2022 02:00    TPro  6.8  /  Alb  4.0  /  TBili  0.4  /  DBili  x   /  AST  22  /  ALT  21  /  AlkPhos  63  11-02       Interval hx: Overnight RRT called for pt fall. Pt assessed, VSS, no changes on neuro exam, pt AAOX3, deemed mechanical fall i/s/o pt's neuro deficits.     MEDICATIONS  (STANDING):  enoxaparin Injectable 40 milliGRAM(s) SubCutaneous every 24 hours    MEDICATIONS  (PRN):  cyclobenzaprine 5 milliGRAM(s) Oral three times a day PRN Muscle Spasm    Vital Signs (24 Hrs):  T(C): 36.6 (11-02-22 @ 05:26), Max: 36.6 (11-01-22 @ 13:02)  HR: 72 (11-02-22 @ 05:26) (72 - 88)  BP: 98/60 (11-02-22 @ 05:26) (98/60 - 111/75)  RR: 17 (11-02-22 @ 05:26) (17 - 18)  SpO2: 100% (11-02-22 @ 05:26) (94% - 100%)  Wt(kg): --  Daily     Daily     I&O's Summary    Physical Exam:  Constitutional: NAD, awake and alert  Respiratory: Breath sounds are clear bilaterally, No wheezing, rales or rhonchi  Cardiovascular: S1 and S2, regular rate and rhythm, no Murmurs, gallops or rubs  Gastrointestinal: Bowel Sounds present, soft, nontender, nondistended, no guarding, no rebound  Extremities: No LE edema b/l  Neurological: pt drowiser than usual but oriented x 3, PERRLA, EOMI, 5/5 dorsi/plantarflexion b/l, decreased  strength and 4/5 str arm extension on R side (R side weakness is baseline deficit), normal  str and arm str on L side, Dysmetria on FTN b/l, gait exam defered by pt b/l patellar hyperreflexia [Exam unchanged from prior day]  Musculoskeletal: CLAROS x 4  Skin: No cyanosis or pallor    LABS:  cret                        15.3   8.02  )-----------( 264      ( 02 Nov 2022 02:00 )             45.8     11-02    141  |  106  |  17  ----------------------------<  90  4.3   |  25  |  0.87    Ca    8.8      02 Nov 2022 02:00    TPro  6.8  /  Alb  4.0  /  TBili  0.4  /  DBili  x   /  AST  22  /  ALT  21  /  AlkPhos  63  11-02

## 2023-02-07 NOTE — ED ADULT NURSE NOTE - NSIMPLEMENTINTERV_GEN_ALL_ED
intact
Implemented All Universal Safety Interventions:  [place] to call system. Call bell, personal items and telephone within reach. Instruct patient to call for assistance. Room bathroom lighting operational. Non-slip footwear when patient is off stretcher. Physically safe environment: no spills, clutter or unnecessary equipment. Stretcher in lowest position, wheels locked, appropriate side rails in place.

## 2023-07-24 ENCOUNTER — APPOINTMENT (OUTPATIENT)
Dept: ORTHOPEDIC SURGERY | Facility: CLINIC | Age: 48
End: 2023-07-24
Payer: MEDICAID

## 2023-07-24 VITALS
HEIGHT: 62 IN | DIASTOLIC BLOOD PRESSURE: 83 MMHG | WEIGHT: 168 LBS | HEART RATE: 111 BPM | BODY MASS INDEX: 30.91 KG/M2 | SYSTOLIC BLOOD PRESSURE: 135 MMHG

## 2023-07-24 DIAGNOSIS — M54.16 RADICULOPATHY, LUMBAR REGION: ICD-10-CM

## 2023-07-24 PROCEDURE — 99204 OFFICE O/P NEW MOD 45 MIN: CPT
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
## 2023-09-22 ENCOUNTER — EMERGENCY (EMERGENCY)
Facility: HOSPITAL | Age: 48
LOS: 0 days | Discharge: HOME | End: 2023-09-22
Attending: STUDENT IN AN ORGANIZED HEALTH CARE EDUCATION/TRAINING PROGRAM
Payer: MEDICAID

## 2023-09-22 VITALS
WEIGHT: 164.91 LBS | TEMPERATURE: 99 F | RESPIRATION RATE: 19 BRPM | SYSTOLIC BLOOD PRESSURE: 152 MMHG | HEIGHT: 62 IN | HEART RATE: 109 BPM | OXYGEN SATURATION: 97 % | DIASTOLIC BLOOD PRESSURE: 109 MMHG

## 2023-09-22 VITALS
SYSTOLIC BLOOD PRESSURE: 154 MMHG | TEMPERATURE: 98 F | RESPIRATION RATE: 18 BRPM | HEART RATE: 92 BPM | OXYGEN SATURATION: 97 % | DIASTOLIC BLOOD PRESSURE: 90 MMHG

## 2023-09-22 DIAGNOSIS — R73.9 HYPERGLYCEMIA, UNSPECIFIED: ICD-10-CM

## 2023-09-22 DIAGNOSIS — Z88.5 ALLERGY STATUS TO NARCOTIC AGENT: ICD-10-CM

## 2023-09-22 DIAGNOSIS — R09.81 NASAL CONGESTION: ICD-10-CM

## 2023-09-22 DIAGNOSIS — Z88.6 ALLERGY STATUS TO ANALGESIC AGENT: ICD-10-CM

## 2023-09-22 DIAGNOSIS — R68.83 CHILLS (WITHOUT FEVER): ICD-10-CM

## 2023-09-22 DIAGNOSIS — Z91.148 PATIENT'S OTHER NONCOMPLIANCE WITH MEDICATION REGIMEN FOR OTHER REASON: ICD-10-CM

## 2023-09-22 DIAGNOSIS — T38.3X6A UNDERDOSING OF INSULIN AND ORAL HYPOGLYCEMIC [ANTIDIABETIC] DRUGS, INITIAL ENCOUNTER: ICD-10-CM

## 2023-09-22 DIAGNOSIS — R05.9 COUGH, UNSPECIFIED: ICD-10-CM

## 2023-09-22 DIAGNOSIS — Z98.890 OTHER SPECIFIED POSTPROCEDURAL STATES: ICD-10-CM

## 2023-09-22 DIAGNOSIS — R00.0 TACHYCARDIA, UNSPECIFIED: ICD-10-CM

## 2023-09-22 DIAGNOSIS — Z20.822 CONTACT WITH AND (SUSPECTED) EXPOSURE TO COVID-19: ICD-10-CM

## 2023-09-22 DIAGNOSIS — R10.13 EPIGASTRIC PAIN: ICD-10-CM

## 2023-09-22 DIAGNOSIS — R53.83 OTHER FATIGUE: ICD-10-CM

## 2023-09-22 DIAGNOSIS — I10 ESSENTIAL (PRIMARY) HYPERTENSION: ICD-10-CM

## 2023-09-22 DIAGNOSIS — E11.65 TYPE 2 DIABETES MELLITUS WITH HYPERGLYCEMIA: ICD-10-CM

## 2023-09-22 DIAGNOSIS — Z98.890 OTHER SPECIFIED POSTPROCEDURAL STATES: Chronic | ICD-10-CM

## 2023-09-22 LAB
ALBUMIN SERPL ELPH-MCNC: 3.6 G/DL — SIGNIFICANT CHANGE UP (ref 3.3–5)
ALP SERPL-CCNC: 96 U/L — SIGNIFICANT CHANGE UP (ref 40–120)
ALT FLD-CCNC: 38 U/L — SIGNIFICANT CHANGE UP (ref 12–78)
ANION GAP SERPL CALC-SCNC: 9 MMOL/L — SIGNIFICANT CHANGE UP (ref 5–17)
APPEARANCE UR: CLEAR — SIGNIFICANT CHANGE UP
AST SERPL-CCNC: 39 U/L — HIGH (ref 15–37)
BASOPHILS # BLD AUTO: 0.04 K/UL — SIGNIFICANT CHANGE UP (ref 0–0.2)
BASOPHILS NFR BLD AUTO: 0.8 % — SIGNIFICANT CHANGE UP (ref 0–2)
BILIRUB SERPL-MCNC: 0.6 MG/DL — SIGNIFICANT CHANGE UP (ref 0.2–1.2)
BILIRUB UR-MCNC: NEGATIVE — SIGNIFICANT CHANGE UP
BUN SERPL-MCNC: 11 MG/DL — SIGNIFICANT CHANGE UP (ref 7–23)
CALCIUM SERPL-MCNC: 9.3 MG/DL — SIGNIFICANT CHANGE UP (ref 8.5–10.1)
CHLORIDE SERPL-SCNC: 99 MMOL/L — SIGNIFICANT CHANGE UP (ref 96–108)
CO2 SERPL-SCNC: 26 MMOL/L — SIGNIFICANT CHANGE UP (ref 22–31)
COLOR SPEC: YELLOW — SIGNIFICANT CHANGE UP
CREAT SERPL-MCNC: 0.84 MG/DL — SIGNIFICANT CHANGE UP (ref 0.5–1.3)
DIFF PNL FLD: NEGATIVE — SIGNIFICANT CHANGE UP
EGFR: 86 ML/MIN/1.73M2 — SIGNIFICANT CHANGE UP
EOSINOPHIL # BLD AUTO: 0.07 K/UL — SIGNIFICANT CHANGE UP (ref 0–0.5)
EOSINOPHIL NFR BLD AUTO: 1.3 % — SIGNIFICANT CHANGE UP (ref 0–6)
GAS PNL BLDV: SIGNIFICANT CHANGE UP
GLUCOSE SERPL-MCNC: 277 MG/DL — HIGH (ref 70–99)
GLUCOSE UR QL: 1000 MG/DL
HCG SERPL-ACNC: 2 MIU/ML — SIGNIFICANT CHANGE UP
HCT VFR BLD CALC: 41.1 % — SIGNIFICANT CHANGE UP (ref 34.5–45)
HGB BLD-MCNC: 14 G/DL — SIGNIFICANT CHANGE UP (ref 11.5–15.5)
IMM GRANULOCYTES NFR BLD AUTO: 0.2 % — SIGNIFICANT CHANGE UP (ref 0–0.9)
KETONES UR-MCNC: ABNORMAL
LEUKOCYTE ESTERASE UR-ACNC: NEGATIVE — SIGNIFICANT CHANGE UP
LIDOCAIN IGE QN: 54 U/L — SIGNIFICANT CHANGE UP (ref 13–75)
LYMPHOCYTES # BLD AUTO: 2 K/UL — SIGNIFICANT CHANGE UP (ref 1–3.3)
LYMPHOCYTES # BLD AUTO: 37.7 % — SIGNIFICANT CHANGE UP (ref 13–44)
MAGNESIUM SERPL-MCNC: 2.1 MG/DL — SIGNIFICANT CHANGE UP (ref 1.6–2.6)
MCHC RBC-ENTMCNC: 32.4 PG — SIGNIFICANT CHANGE UP (ref 27–34)
MCHC RBC-ENTMCNC: 34.1 G/DL — SIGNIFICANT CHANGE UP (ref 32–36)
MCV RBC AUTO: 95.1 FL — SIGNIFICANT CHANGE UP (ref 80–100)
MONOCYTES # BLD AUTO: 0.34 K/UL — SIGNIFICANT CHANGE UP (ref 0–0.9)
MONOCYTES NFR BLD AUTO: 6.4 % — SIGNIFICANT CHANGE UP (ref 2–14)
NEUTROPHILS # BLD AUTO: 2.85 K/UL — SIGNIFICANT CHANGE UP (ref 1.8–7.4)
NEUTROPHILS NFR BLD AUTO: 53.6 % — SIGNIFICANT CHANGE UP (ref 43–77)
NITRITE UR-MCNC: NEGATIVE — SIGNIFICANT CHANGE UP
NRBC # BLD: 0 /100 WBCS — SIGNIFICANT CHANGE UP (ref 0–0)
PH UR: 6 — SIGNIFICANT CHANGE UP (ref 5–8)
PLATELET # BLD AUTO: 272 K/UL — SIGNIFICANT CHANGE UP (ref 150–400)
POTASSIUM SERPL-MCNC: 4.8 MMOL/L — SIGNIFICANT CHANGE UP (ref 3.5–5.3)
POTASSIUM SERPL-SCNC: 4.8 MMOL/L — SIGNIFICANT CHANGE UP (ref 3.5–5.3)
PROT SERPL-MCNC: 7.8 GM/DL — SIGNIFICANT CHANGE UP (ref 6–8.3)
PROT UR-MCNC: NEGATIVE MG/DL — SIGNIFICANT CHANGE UP
RAPID RVP RESULT: SIGNIFICANT CHANGE UP
RBC # BLD: 4.32 M/UL — SIGNIFICANT CHANGE UP (ref 3.8–5.2)
RBC # FLD: 12.4 % — SIGNIFICANT CHANGE UP (ref 10.3–14.5)
SARS-COV-2 RNA SPEC QL NAA+PROBE: SIGNIFICANT CHANGE UP
SODIUM SERPL-SCNC: 134 MMOL/L — LOW (ref 135–145)
SP GR SPEC: 1.01 — SIGNIFICANT CHANGE UP (ref 1.01–1.02)
TROPONIN I, HIGH SENSITIVITY RESULT: 5 NG/L — SIGNIFICANT CHANGE UP
UROBILINOGEN FLD QL: NEGATIVE MG/DL — SIGNIFICANT CHANGE UP
WBC # BLD: 5.31 K/UL — SIGNIFICANT CHANGE UP (ref 3.8–10.5)
WBC # FLD AUTO: 5.31 K/UL — SIGNIFICANT CHANGE UP (ref 3.8–10.5)

## 2023-09-22 PROCEDURE — 99285 EMERGENCY DEPT VISIT HI MDM: CPT

## 2023-09-22 PROCEDURE — 93010 ELECTROCARDIOGRAM REPORT: CPT

## 2023-09-22 PROCEDURE — 71046 X-RAY EXAM CHEST 2 VIEWS: CPT | Mod: 26

## 2023-09-22 PROCEDURE — 76705 ECHO EXAM OF ABDOMEN: CPT | Mod: 26

## 2023-09-22 RX ORDER — ACETAMINOPHEN 500 MG
1000 TABLET ORAL ONCE
Refills: 0 | Status: COMPLETED | OUTPATIENT
Start: 2023-09-22 | End: 2023-09-22

## 2023-09-22 RX ORDER — SODIUM CHLORIDE 9 MG/ML
1000 INJECTION INTRAMUSCULAR; INTRAVENOUS; SUBCUTANEOUS ONCE
Refills: 0 | Status: COMPLETED | OUTPATIENT
Start: 2023-09-22 | End: 2023-09-22

## 2023-09-22 RX ADMIN — SODIUM CHLORIDE 1000 MILLILITER(S): 9 INJECTION INTRAMUSCULAR; INTRAVENOUS; SUBCUTANEOUS at 21:39

## 2023-09-22 RX ADMIN — SODIUM CHLORIDE 1000 MILLILITER(S): 9 INJECTION INTRAMUSCULAR; INTRAVENOUS; SUBCUTANEOUS at 21:37

## 2023-09-22 RX ADMIN — Medication 400 MILLIGRAM(S): at 21:37

## 2023-09-22 NOTE — ED PROVIDER NOTE - OBJECTIVE STATEMENT
48-year-old female, history of type 2 diabetes, hypertension, presenting with high blood sugar.  Ongoing for the past few weeks.  Seen at urgent care today and referred here after her sugars were found to be in the 500s.  She endorses cough and some congestion but this.  Aside from this she has been feeling generally fatigued.  Review of systems otherwise negative.  She has been out of the metformin she was prescribed previously, has been seeking prescription, unable to obtain.  No allergies to medications.  No history of abdominal pelvic surgery aside from tummy tuck.  No other recent medication changes.

## 2023-09-22 NOTE — ED PROVIDER NOTE - NS ED ROS FT
GENERAL: + chills  EYES: no eye pain  HEENT: no neck pain  CARDIAC: no chest pain  PULMONARY: no SOB  GI: no abdominal pain  : no dysuria  SKIN: no rashes  NEURO: no headache  MSK: no new joint pain   ENDO: + hyperglycemia

## 2023-09-22 NOTE — ED ADULT NURSE NOTE - OBJECTIVE STATEMENT
Pt AOx4, responsive, ambulatory,  by bedside. Pt c/o dry mouth, increased thirst, increased urination, fatigue, intermittent abdominal pain for 2 weeks. Pt states her fingerstick showed blood glucose at 576 around 4pm and took  metformin 500mg; states UC also gave 12units of insulin sub-Q around 6-6:30pm. States her PCP discontinued her metformin in March; states PCP appt on Monday. Denies CP, fever/chills, n/v/d, difficulty breathing, burning/painful urination. ecg completed. placed on cardiac monitor, tolerating RA. LMP beginning of 2023; denies pregnancy. PMH DM, HTN, HLD.

## 2023-09-22 NOTE — ED PROVIDER NOTE - OTHER FINDINGS
Sinus tachycardia.  Unremarkable intervals.  P wave morphology is somewhat indeterminate secondary to baseline artifact.  Not grossly normal.  QRS morphology is unremarkable.  Nonspecific T wave changes in V6 and 3.  No STEMI equivalents.  No pathological Q waves are noted.

## 2023-09-22 NOTE — ED ADULT NURSE NOTE - SUICIDE SCREENING QUESTION 3
No Post-Care Instructions: Should the patient develop any fevers, chills, bleeding, severe pain patient will contact the office immediately.

## 2023-09-22 NOTE — ED ADULT NURSE NOTE - NSICDXPASTMEDICALHX_GEN_ALL_CORE_FT
PAST MEDICAL HISTORY:  Diabetes     Hypertension     Mild intermittent asthma without complication

## 2023-09-22 NOTE — ED PROVIDER NOTE - CLINICAL SUMMARY MEDICAL DECISION MAKING FREE TEXT BOX
43-year-old female, history of type 2 diabetes, presenting with subacute hyperglycemia after running out of her metformin.  She is tachycardic, borderline fever, will double check with rectal temperature.  Nonfocal exam aside from dry mucous membranes and mild epigastric tenderness.  Given borderline temp and hyperglycemia, will assess for bacterial infection as cause of hyperglycemia.  No evidence suggest meningitis or encephalitis at this time.  No evidence of soft tissue infection over the integumentary.  Will assess for focal pneumonia and urinary tract infection.  Rule out bacteremia.  Assess for evidence of acute cholecystitis, acute cholangitis, low pretest suspicion.  Given overall fevers surgical risk factors, lack of GI symptomology, minimal tenderness, will repeat abdominal exam, if worsening, will progress to CT abdomen pelvis.  No well score risk factors aside from tachycardia, will follow up on rectal temperature, however likely explained by fever given borderline oral temp, felt to be overall low pretest probability for PE.    Rule out acute cardiac injury as cause of hyperglycemia, troponin. Plan will be to obtain CMP, CBC, troponin, VBG, UA, beta hydroxy, hCG, chest x-ray, urinalysis, urine culture.  Fluid resuscitation.  Close reassessment.

## 2023-09-22 NOTE — ED ADULT NURSE NOTE - BEFAST SPEECH PHRASE
TRANSFER - OUT REPORT: 
 
Verbal report given to [facility] RN(name) on Manjit Lira  being transferred to oncology(unit) for ordered procedure Report consisted of patients Situation, Background, Assessment and  
Recommendations(SBAR). Information from the following report(s) SBAR and Procedure Summary was reviewed with the receiving nurse. Lines:  
Peripheral IV 12/07/18 Right Antecubital (Active) Site Assessment Clean, dry, & intact 12/7/2018  3:29 PM  
Phlebitis Assessment 0 12/7/2018  3:29 PM  
Infiltration Assessment 0 12/7/2018  3:29 PM  
Dressing Status Clean, dry, & intact 12/7/2018  3:29 PM  
Dressing Type Transparent;Tape 12/7/2018  3:29 PM  
Hub Color/Line Status Blue 12/7/2018  3:29 PM  
  
 
Opportunity for questions and clarification was provided.
Yes

## 2023-09-22 NOTE — ED ADULT TRIAGE NOTE - CHIEF COMPLAINT QUOTE
Increased fatigue and urination [id number]  did not check FS but finally last night checked it was 499.  Today 576 went to Urgent care 538 Increased fatigue and urination [id number]  did not check FS but finally last night checked it was 499.  Today 576 went to Urgent care 538. 12units insulin given at  in stomach  HX DM, HTN, smoker, HLD

## 2023-09-22 NOTE — ED PROVIDER NOTE - PATIENT PORTAL LINK FT
You can access the FollowMyHealth Patient Portal offered by [facility] by registering at the following website: [email or web address]. By joining [employer]’s FollowMyHealth portal, you will also be able to view your health information using other applications (apps) compatible with our system.

## 2023-09-22 NOTE — ED ADULT NURSE REASSESSMENT NOTE - NS ED NURSE REASSESS COMMENT FT1
patient remains a&ox4, IV removed. glucose levels improved. patient VS stable. patient able to ambulate with steady gait. D/C ready. Going home with . NAD Noted

## 2023-09-22 NOTE — ED PROVIDER NOTE - NSFOLLOWUPINSTRUCTIONS_ED_ALL_ED_FT
Please follow up with your primary care doctor as discussed  Take all medication as directed  Please return for

## 2023-09-22 NOTE — ED PROVIDER NOTE - PHYSICAL EXAMINATION
Gen: NAD, non-toxic appearing  Head: normal appearing  HEENT: normal conjunctiva  Lung: no respiratory distress, speaking in full sentences, ctab      CV: regular rate and rhythm, no murmurs  Abd: soft, non distended, + mild epigastric tenderness  MSK: no visible deformities, no calf tenderness, no intersitial edema over the lower extremities   Neuro: alert and grossly oriented, no gross motor deficits  Skin: No addie rashes

## 2023-09-22 NOTE — ED PROVIDER NOTE - ATTENDING CONTRIBUTION TO CARE
Agree with PMH and HPI as above  Patient presenting with hyperglycemia, patient has been off metformin since march  Patient sent in by   Patient given insulin SQ at   Patient without signs of symptoms of infectious cause of hyperglycemia  Labs without leukocytosis  No sign of acidosis on VBG or BMP  No sign of urinary tract infection or pneumonia on CXR  Patient glucose on BMP improved  Patient with benign abdominal exam with no tenderness lower suspicion for intraabdominal pathology     Patient has follow up with her PCP scheduled in 2 days.  Will discharge patient with strict return precautions and she will follow up with her primary care physician.

## 2023-09-22 NOTE — ED ADULT NURSE NOTE - CHIEF COMPLAINT QUOTE
Increased fatigue and urination [id number]  did not check FS but finally last night checked it was 499.  Today 576 went to Urgent care 538. 12units insulin given at  in stomach  HX DM, HTN, smoker, HLD

## 2023-09-22 NOTE — ED ADULT NURSE NOTE - NSFALLUNIVINTERV_ED_ALL_ED
- - -
Bed/Stretcher in lowest position, wheels locked, appropriate side rails in place/Call bell, personal items and telephone in reach/Instruct patient to call for assistance before getting out of bed/chair/stretcher/Non-slip footwear applied when patient is off stretcher/[place] to call system/Physically safe environment - no spills, clutter or unnecessary equipment/Purposeful proactive rounding/Room/bathroom lighting operational, light cord in reach

## 2023-09-23 LAB
B-OH-BUTYR SERPL-SCNC: 0.6 MMOL/L — HIGH
CULTURE RESULTS: SIGNIFICANT CHANGE UP
SPECIMEN SOURCE: SIGNIFICANT CHANGE UP

## 2023-09-28 LAB
CULTURE RESULTS: SIGNIFICANT CHANGE UP
CULTURE RESULTS: SIGNIFICANT CHANGE UP
SPECIMEN SOURCE: SIGNIFICANT CHANGE UP
SPECIMEN SOURCE: SIGNIFICANT CHANGE UP

## 2023-11-09 NOTE — PATIENT PROFILE ADULT - DOES PATIENT HAVE ADVANCE DIRECTIVE
no
Bill For Surgical Tray: no
Clinical Notes (To The Lab): Fasting: yes
Billing Type: Third-Party Bill
Expected Date Of Service: 10/10/2023

## 2023-12-04 ENCOUNTER — APPOINTMENT (OUTPATIENT)
Dept: ORTHOPEDIC SURGERY | Facility: CLINIC | Age: 48
End: 2023-12-04

## 2025-03-12 NOTE — ED PROVIDER NOTE - NS_EDPROVIDERDISPOUSERTYPE_ED_A_ED
PAST SURGICAL HISTORY:  No significant past surgical history      Attending Attestation (For Attendings USE Only)...

## 2025-04-07 NOTE — ED PROVIDER NOTE - CROS ED CARDIOVAS ALL NEG
Blood work today including vitamin d and thyroid  Continue all current medications  Schedule an echo  RTO in 6 months   - - -